# Patient Record
Sex: FEMALE | Race: WHITE | Employment: UNEMPLOYED | URBAN - NONMETROPOLITAN AREA
[De-identification: names, ages, dates, MRNs, and addresses within clinical notes are randomized per-mention and may not be internally consistent; named-entity substitution may affect disease eponyms.]

---

## 2020-04-23 ENCOUNTER — VIRTUAL VISIT (OUTPATIENT)
Dept: FAMILY MEDICINE CLINIC | Age: 25
End: 2020-04-23
Payer: COMMERCIAL

## 2020-04-23 PROCEDURE — G8427 DOCREV CUR MEDS BY ELIG CLIN: HCPCS | Performed by: NURSE PRACTITIONER

## 2020-04-23 PROCEDURE — 99213 OFFICE O/P EST LOW 20 MIN: CPT | Performed by: NURSE PRACTITIONER

## 2020-04-23 ASSESSMENT — ENCOUNTER SYMPTOMS
COUGH: 0
ALLERGIC/IMMUNOLOGIC NEGATIVE: 1
BACK PAIN: 0
SINUS PRESSURE: 0
CHOKING: 0
RESPIRATORY NEGATIVE: 1
COLOR CHANGE: 0
SORE THROAT: 0
EYE PAIN: 0
CONSTIPATION: 0
EYE DISCHARGE: 0
EYE REDNESS: 0
BLOOD IN STOOL: 0
TROUBLE SWALLOWING: 0
ABDOMINAL PAIN: 0
GASTROINTESTINAL NEGATIVE: 1
PHOTOPHOBIA: 0
EYE ITCHING: 0
WHEEZING: 0
VOMITING: 0
RHINORRHEA: 0
APNEA: 0
NAUSEA: 0
SHORTNESS OF BREATH: 0
CHEST TIGHTNESS: 0
DIARRHEA: 0
STRIDOR: 0

## 2020-04-23 ASSESSMENT — PATIENT HEALTH QUESTIONNAIRE - PHQ9
SUM OF ALL RESPONSES TO PHQ9 QUESTIONS 1 & 2: 0
2. FEELING DOWN, DEPRESSED OR HOPELESS: 0
1. LITTLE INTEREST OR PLEASURE IN DOING THINGS: 0
SUM OF ALL RESPONSES TO PHQ QUESTIONS 1-9: 0
SUM OF ALL RESPONSES TO PHQ QUESTIONS 1-9: 0

## 2020-04-23 NOTE — PROGRESS NOTES
Trevon Hunter is a 30 y.o. female evaluated via telephone on 4/23/2020. Consent:  She and/or health care decision maker is aware that that she may receive a bill for this telephone service, depending on her insurance coverage, and has provided verbal consent to proceed: Yes      Documentation:  I communicated with the patient and/or health care decision maker about establish care. Details of this discussion including any medical advice provided: n/a      I affirm this is a Patient Initiated Episode with an Established Patient who has not had a related appointment within my department in the past 7 days or scheduled within the next 24 hours.     Total Time: minutes: 5-10 minutes    Note: not billable if this call serves to triage the patient into an appointment for the relevant concern      Lashonda Turpin

## 2020-04-23 NOTE — PROGRESS NOTES
2020    TELEHEALTH EVALUATION -- Audio/Visual (During [id number]-68 public health emergency)    HPI:    Dania Lorenz (:  1995) has requested an audio/video evaluation for the following concern(s):    Shabnam Luna presents for a virtual visit to establish care. She denies having any past medical history. She states she is healthy and has no complaints. She is a teacher. 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 She denies any past medical history and states she is healthy. She sees OB/GYN at [facility] and denies ever having an abnormal pap smear. She is trying to start the adoption process for her nephew and is needing paperwork completed for her nephew. Shabnam Luna denies any personal history of being in trouble with the law such as having a felany or misdemeanor regarding any abuse to children or to adults/elderly. She denies ever being hospitalized for mental health. She denies having any depression, anxiety, or any mood disorders. Review of Systems   Constitutional: Negative. Negative for activity change, appetite change, chills, diaphoresis, fatigue, fever and unexpected weight change. HENT: Negative. Negative for ear pain, rhinorrhea, sinus pressure, sneezing, sore throat and trouble swallowing. Eyes: Negative for photophobia, pain, discharge, redness, itching and visual disturbance. Respiratory: Negative. Negative for apnea, cough, choking, chest tightness, shortness of breath, wheezing and stridor. Cardiovascular: Negative for chest pain, palpitations and leg swelling. Gastrointestinal: Negative. Negative for abdominal pain, blood in stool, constipation, diarrhea, nausea and vomiting. Genitourinary: Negative. Negative for decreased urine volume, difficulty urinating, dysuria, enuresis, flank pain, frequency, genital sores, hematuria and urgency. Musculoskeletal: Negative.   Negative for arthralgias, back pain, gait problem, joint swelling, myalgias, neck pain and Aged Out    Meningococcal (ACWY) vaccine  Aged Out    Pneumococcal 0-64 years Vaccine  Aged Out    Varicella vaccine  Discontinued       PHYSICAL EXAMINATION:  [ INSTRUCTIONS:  \"[x]\" Indicates a positive item  \"[]\" Indicates a negative item     Respiratory rate- 14       Constitutional: [x] Appears well-developed and well-nourished [x] No apparent distress      [] Abnormal-   Mental status  [x] Alert and awake  [x] Oriented to person/place/time [x]Able to follow commands      Eyes:  EOM    [x]  Normal  [] Abnormal-  Sclera  [x]  Normal  [] Abnormal -         Discharge [x]  None visible  [] Abnormal -    HENT:   [x] Normocephalic, atraumatic. [] Abnormal   [x] Mouth/Throat: Mucous membranes are moist.     External Ears [x] Normal  [] Abnormal-     Neck: [x] No visualized mass     Pulmonary/Chest: [x] Respiratory effort normal.  [x] No visualized signs of difficulty breathing or respiratory distress        [] Abnormal-      Musculoskeletal:   [x] Normal gait with no signs of ataxia         [x] Normal range of motion of neck        [] Abnormal-       Neurological:        [x] No Facial Asymmetry (Cranial nerve 7 motor function) (limited exam to video visit)          [x] No gaze palsy        [] Abnormal-         Skin:        [x] No significant exanthematous lesions or discoloration noted on facial skin         [] Abnormal-            Psychiatric:       [x] Normal Affect [] No Hallucinations        [] Abnormal-     ASSESSMENT/PLAN:  1. Encounter to establish care    2. Encounter for physical examination of prospective adoptive parent  -Patient is to complete her portion of the paperwork and then drop off paperwork for this provider to complete. Per this medical provider's opinion, patient would be a safe parent and would be cleared for adopting a child. Return in about 4 months (around 8/23/2020) for Physical with fasting blood work .     Elena Hester is a 30 y.o. female being evaluated by a Virtual Visit

## 2020-04-27 ENCOUNTER — TELEPHONE (OUTPATIENT)
Dept: ADMINISTRATIVE | Age: 25
End: 2020-04-27

## 2021-01-12 LAB
ABO, EXTERNAL RESULT: NORMAL
HEP B, EXTERNAL RESULT: NEGATIVE
HEPATITIS C ANTIBODY, EXTERNAL RESULT: NEGATIVE
HIV, EXTERNAL RESULT: NEGATIVE
RH FACTOR, EXTERNAL RESULT: POSITIVE
RPR, EXTERNAL RESULT: NON REACTIVE
RUBELLA TITER, EXTERNAL RESULT: NORMAL

## 2021-01-12 RX ORDER — CLINDAMYCIN HYDROCHLORIDE 300 MG/1
CAPSULE ORAL
COMMUNITY
Start: 2021-01-09 | End: 2021-05-24

## 2021-02-05 LAB
C. TRACHOMATIS, EXTERNAL RESULT: NEGATIVE
N. GONORRHOEAE, EXTERNAL RESULT: NEGATIVE

## 2021-05-24 ENCOUNTER — HOSPITAL ENCOUNTER (EMERGENCY)
Age: 26
Discharge: HOME | End: 2021-05-24
Attending: STUDENT IN AN ORGANIZED HEALTH CARE EDUCATION/TRAINING PROGRAM
Payer: COMMERCIAL

## 2021-05-24 VITALS
SYSTOLIC BLOOD PRESSURE: 143 MMHG | BODY MASS INDEX: 28.32 KG/M2 | OXYGEN SATURATION: 100 % | WEIGHT: 170 LBS | HEIGHT: 65 IN | TEMPERATURE: 97.1 F | DIASTOLIC BLOOD PRESSURE: 93 MMHG | HEART RATE: 90 BPM | RESPIRATION RATE: 18 BRPM

## 2021-05-24 DIAGNOSIS — L03.90 CELLULITIS, UNSPECIFIED CELLULITIS SITE: ICD-10-CM

## 2021-05-24 DIAGNOSIS — I80.8 THROMBOPHLEBITIS OF ARM, RIGHT: Primary | ICD-10-CM

## 2021-05-24 PROCEDURE — 99282 EMERGENCY DEPT VISIT SF MDM: CPT

## 2021-05-24 PROCEDURE — 6370000000 HC RX 637 (ALT 250 FOR IP): Performed by: STUDENT IN AN ORGANIZED HEALTH CARE EDUCATION/TRAINING PROGRAM

## 2021-05-24 RX ORDER — CEPHALEXIN 500 MG/1
500 CAPSULE ORAL ONCE
Status: COMPLETED | OUTPATIENT
Start: 2021-05-24 | End: 2021-05-24

## 2021-05-24 RX ORDER — CETIRIZINE HYDROCHLORIDE 10 MG/1
10 TABLET ORAL DAILY
COMMUNITY

## 2021-05-24 RX ORDER — CEPHALEXIN 500 MG/1
500 CAPSULE ORAL 4 TIMES DAILY
Qty: 28 CAPSULE | Refills: 0 | Status: SHIPPED | OUTPATIENT
Start: 2021-05-24 | End: 2021-05-31

## 2021-05-24 RX ADMIN — CEPHALEXIN 500 MG: 500 CAPSULE ORAL at 21:56

## 2021-05-24 ASSESSMENT — ENCOUNTER SYMPTOMS
ABDOMINAL PAIN: 0
VOMITING: 0
NAUSEA: 0

## 2021-05-25 NOTE — ED PROVIDER NOTES
 Diabetes Father     Cancer Father 54        skin, prostate    Pacemaker Father     Heart Disease Father     Prostate Cancer Father     Arrhythmia Father         Atrial fibrillation    No Known Problems Sister     Heart Disease Maternal Grandfather     No Known Problems Sister           SOCIAL HISTORY       Social History     Socioeconomic History    Marital status:      Spouse name: None    Number of children: None    Years of education: None    Highest education level: None   Occupational History    None   Tobacco Use    Smoking status: Never Smoker    Smokeless tobacco: Never Used   Vaping Use    Vaping Use: Never used   Substance and Sexual Activity    Alcohol use: Not Currently    Drug use: Never    Sexual activity: Yes     Partners: Male   Other Topics Concern    None   Social History Narrative    None     Social Determinants of Health     Financial Resource Strain:     Difficulty of Paying Living Expenses:    Food Insecurity:     Worried About Running Out of Food in the Last Year:     Ran Out of Food in the Last Year:    Transportation Needs:     Lack of Transportation (Medical):  Lack of Transportation (Non-Medical):    Physical Activity:     Days of Exercise per Week:     Minutes of Exercise per Session:    Stress:     Feeling of Stress :    Social Connections:     Frequency of Communication with Friends and Family:     Frequency of Social Gatherings with Friends and Family:     Attends Bahai Services:     Active Member of Clubs or Organizations:     Attends Club or Organization Meetings:     Marital Status:    Intimate Partner Violence:     Fear of Current or Ex-Partner:     Emotionally Abused:     Physically Abused:     Sexually Abused:        SCREENINGS                            REVIEW OF SYSTEMS    (2-9 systems for level 4, 10 or more for level 5)   Review of Systems   Constitutional: Negative for chills and fever. HENT: Negative. Gastrointestinal: Negative for abdominal pain, nausea and vomiting. Genitourinary: Negative for dysuria, urgency and vaginal bleeding. Skin: Positive for rash. PHYSICAL EXAM    (up to 7 for level 4, 8 or more for level 5)     ED Triage Vitals [21]   BP Temp Temp Source Pulse Resp SpO2 Height Weight   (!) 143/93 97.1 °F (36.2 °C) Oral 106 18 99 % 5' 5\" (1.651 m) 170 lb (77.1 kg)       Physical Exam  Constitutional:       Appearance: Normal appearance. HENT:      Head: Normocephalic and atraumatic. Cardiovascular:      Rate and Rhythm: Normal rate and regular rhythm. Pulmonary:      Effort: Pulmonary effort is normal. No respiratory distress. Abdominal:      Comments: Gravid nontender abdomen. Ultrasound shows well-developed fetus, normal movement fetal normal cardiac activity, fetal heart rate 148   Neurological:      Mental Status: She is alert. DIAGNOSTIC RESULTS     EKG: All EKG's are interpreted by the Emergency Department Physician who either signs or Co-signs this chart in the absence of a cardiologist.        RADIOLOGY:   Non-plain film images such as CT, Ultrasound and MRI are read by the radiologist. Plain radiographic images are visualized and preliminarily interpreted by the emergency physician. Interpretation per the Radiologist below, if available at the time of this note:    No orders to display         LABS:  Labs Reviewed - No data to display    All other labs were within normal range or not returned as of this dictation.     EMERGENCY DEPARTMENT COURSE and DIFFERENTIAL DIAGNOSIS/MDM:   Rambo Armendariz is a 27 y.o. female who presents to the emergency department with the complaint of well-appearing pregnant female with gravid uterus,  at 26 weeks presents due to rash in the right antecubital, concern for possible thrombophlebitis as near site of IV placement at this past Friday area is mildly erythematous and warm to palpation there is no crepitus, no abscess Limited bedside ultrasound of superficial vein without superficial clot. Concern for possible thrombophlebitis encourage continued warm compress however due to extension beyond IV site concern for possible secondary cellulitis will start patient on Keflex, does have listed sulfa allergy, penicillin allergy believes she may have tolerated cephalexin previously, no history of anaphylaxis to antibiotics. I did have a conversation with patient over potential for cross sensitivity to cephalosporins with penicillin allergy, at this time patient feels comfortable beginning this antibiotic, she was given return precautions for allergy response. Patient to follow-up with OB/GYN. CRITICAL CARE TIME   Total Critical Care time was 0 minutes, excluding separately reportable procedures. There was a high probability of clinically significant/life threatening deterioration in the patient's condition which required my urgent intervention. Clinical concern   Intervention     CONSULTS:  None    PROCEDURES:  Unless otherwise noted below, none     Procedures  FETAL ULTRASOUND: A limited, bedside pelvic ultrasound was performed using a transabdominal probe. The medical necessity was to evaluate for signs of fetal distress. The structures studied were the uterus and its contents. FINDINGS:  Fetus was visualized  Gross fetal movement was visualized. Fetal heart tones measured at 148 bpm.  The study was technically adequate. FINAL IMPRESSION      1. Thrombophlebitis of arm, right    2.  Cellulitis, unspecified cellulitis site          DISPOSITION/PLAN   DISPOSITION Decision To Discharge 05/24/2021 09:52:51 PM      PATIENT REFERRED TO:  [facility]  [address] [facility]  [phone]    If symptoms worsen      DISCHARGE MEDICATIONS:  Discharge Medication List as of 5/24/2021 10:04 PM      START taking these medications    Details   cephALEXin (KEFLEX) 500 MG capsule Take

## 2021-07-30 LAB — GBS, EXTERNAL RESULT: NEGATIVE

## 2021-08-27 ENCOUNTER — ANESTHESIA (OUTPATIENT)
Dept: LABOR AND DELIVERY | Age: 26
End: 2021-08-27
Payer: COMMERCIAL

## 2021-08-27 ENCOUNTER — ANESTHESIA EVENT (OUTPATIENT)
Dept: LABOR AND DELIVERY | Age: 26
End: 2021-08-27
Payer: COMMERCIAL

## 2021-08-27 ENCOUNTER — HOSPITAL ENCOUNTER (OUTPATIENT)
Age: 26
Discharge: HOME | End: 2021-08-27
Attending: OBSTETRICS & GYNECOLOGY | Admitting: OBSTETRICS & GYNECOLOGY
Payer: COMMERCIAL

## 2021-08-27 ENCOUNTER — HOSPITAL ENCOUNTER (INPATIENT)
Age: 26
LOS: 2 days | Discharge: HOME | End: 2021-08-30
Attending: OBSTETRICS & GYNECOLOGY | Admitting: OBSTETRICS & GYNECOLOGY
Payer: COMMERCIAL

## 2021-08-27 VITALS
WEIGHT: 181 LBS | DIASTOLIC BLOOD PRESSURE: 83 MMHG | RESPIRATION RATE: 18 BRPM | BODY MASS INDEX: 30.9 KG/M2 | TEMPERATURE: 98.4 F | HEIGHT: 64 IN | HEART RATE: 92 BPM | SYSTOLIC BLOOD PRESSURE: 132 MMHG

## 2021-08-27 VITALS — OXYGEN SATURATION: 98 % | SYSTOLIC BLOOD PRESSURE: 130 MMHG | DIASTOLIC BLOOD PRESSURE: 71 MMHG

## 2021-08-27 DIAGNOSIS — Z98.891 S/P PRIMARY LOW TRANSVERSE C-SECTION: Primary | ICD-10-CM

## 2021-08-27 LAB
ABO/RH: NORMAL
AMPHETAMINE SCREEN, URINE: NORMAL
ANTIBODY SCREEN: NORMAL
BARBITURATE SCREEN URINE: NORMAL
BASOPHILS ABSOLUTE: 0.1 K/UL (ref 0–0.2)
BASOPHILS RELATIVE PERCENT: 0.8 %
BENZODIAZEPINE SCREEN, URINE: NORMAL
BUPRENORPHINE URINE: NORMAL
CANNABINOID SCREEN URINE: NORMAL
COCAINE METABOLITE SCREEN URINE: NORMAL
EOSINOPHILS ABSOLUTE: 0.1 K/UL (ref 0–0.6)
EOSINOPHILS RELATIVE PERCENT: 0.4 %
HCT VFR BLD CALC: 34.3 % (ref 36–48)
HEMOGLOBIN: 11.3 G/DL (ref 12–16)
LYMPHOCYTES ABSOLUTE: 1.5 K/UL (ref 1–5.1)
LYMPHOCYTES RELATIVE PERCENT: 11.4 %
Lab: NORMAL
MCH RBC QN AUTO: 28.7 PG (ref 26–34)
MCHC RBC AUTO-ENTMCNC: 33 G/DL (ref 31–36)
MCV RBC AUTO: 86.7 FL (ref 80–100)
METHADONE SCREEN, URINE: NORMAL
MONOCYTES ABSOLUTE: 0.8 K/UL (ref 0–1.3)
MONOCYTES RELATIVE PERCENT: 6.4 %
NEUTROPHILS ABSOLUTE: 10.5 K/UL (ref 1.7–7.7)
NEUTROPHILS RELATIVE PERCENT: 81 %
OPIATE SCREEN URINE: NORMAL
OXYCODONE URINE: NORMAL
PDW BLD-RTO: 16.9 % (ref 12.4–15.4)
PH UA: 6
PHENCYCLIDINE SCREEN URINE: NORMAL
PLATELET # BLD: 232 K/UL (ref 135–450)
PMV BLD AUTO: 9.4 FL (ref 5–10.5)
PROPOXYPHENE SCREEN: NORMAL
RBC # BLD: 3.96 M/UL (ref 4–5.2)
WBC # BLD: 13 K/UL (ref 4–11)

## 2021-08-27 PROCEDURE — 2580000003 HC RX 258: Performed by: OBSTETRICS & GYNECOLOGY

## 2021-08-27 PROCEDURE — 7100000001 HC PACU RECOVERY - ADDTL 15 MIN: Performed by: OBSTETRICS & GYNECOLOGY

## 2021-08-27 PROCEDURE — 3700000001 HC ADD 15 MINUTES (ANESTHESIA): Performed by: OBSTETRICS & GYNECOLOGY

## 2021-08-27 PROCEDURE — 2500000003 HC RX 250 WO HCPCS: Performed by: NURSE ANESTHETIST, CERTIFIED REGISTERED

## 2021-08-27 PROCEDURE — 86780 TREPONEMA PALLIDUM: CPT

## 2021-08-27 PROCEDURE — 80307 DRUG TEST PRSMV CHEM ANLYZR: CPT

## 2021-08-27 PROCEDURE — 6360000002 HC RX W HCPCS: Performed by: NURSE ANESTHETIST, CERTIFIED REGISTERED

## 2021-08-27 PROCEDURE — 86850 RBC ANTIBODY SCREEN: CPT

## 2021-08-27 PROCEDURE — 86901 BLOOD TYPING SEROLOGIC RH(D): CPT

## 2021-08-27 PROCEDURE — 2709999900 HC NON-CHARGEABLE SUPPLY: Performed by: OBSTETRICS & GYNECOLOGY

## 2021-08-27 PROCEDURE — 99211 OFF/OP EST MAY X REQ PHY/QHP: CPT

## 2021-08-27 PROCEDURE — 88307 TISSUE EXAM BY PATHOLOGIST: CPT

## 2021-08-27 PROCEDURE — 3700000025 EPIDURAL BLOCK: Performed by: ANESTHESIOLOGY

## 2021-08-27 PROCEDURE — 6360000002 HC RX W HCPCS: Performed by: OBSTETRICS & GYNECOLOGY

## 2021-08-27 PROCEDURE — 86900 BLOOD TYPING SEROLOGIC ABO: CPT

## 2021-08-27 PROCEDURE — 7100000000 HC PACU RECOVERY - FIRST 15 MIN: Performed by: OBSTETRICS & GYNECOLOGY

## 2021-08-27 PROCEDURE — 3700000000 HC ANESTHESIA ATTENDED CARE: Performed by: OBSTETRICS & GYNECOLOGY

## 2021-08-27 PROCEDURE — 85025 COMPLETE CBC W/AUTO DIFF WBC: CPT

## 2021-08-27 PROCEDURE — 3609079900 HC CESAREAN SECTION: Performed by: OBSTETRICS & GYNECOLOGY

## 2021-08-27 PROCEDURE — 59514 CESAREAN DELIVERY ONLY: CPT | Performed by: OBSTETRICS & GYNECOLOGY

## 2021-08-27 RX ORDER — EPHEDRINE SULFATE 50 MG/ML
INJECTION INTRAVENOUS
Status: COMPLETED
Start: 2021-08-27 | End: 2021-08-27

## 2021-08-27 RX ORDER — MIDAZOLAM HYDROCHLORIDE 1 MG/ML
INJECTION INTRAMUSCULAR; INTRAVENOUS PRN
Status: DISCONTINUED | OUTPATIENT
Start: 2021-08-27 | End: 2021-08-27 | Stop reason: SDUPTHER

## 2021-08-27 RX ORDER — ONDANSETRON 2 MG/ML
4 INJECTION INTRAMUSCULAR; INTRAVENOUS EVERY 6 HOURS PRN
Status: DISCONTINUED | OUTPATIENT
Start: 2021-08-27 | End: 2021-08-28

## 2021-08-27 RX ORDER — LIDOCAINE HYDROCHLORIDE 20 MG/ML
INJECTION, SOLUTION EPIDURAL; INFILTRATION; INTRACAUDAL; PERINEURAL PRN
Status: DISCONTINUED | OUTPATIENT
Start: 2021-08-27 | End: 2021-08-27 | Stop reason: SDUPTHER

## 2021-08-27 RX ORDER — SODIUM CHLORIDE, SODIUM LACTATE, POTASSIUM CHLORIDE, CALCIUM CHLORIDE 600; 310; 30; 20 MG/100ML; MG/100ML; MG/100ML; MG/100ML
INJECTION, SOLUTION INTRAVENOUS CONTINUOUS
Status: DISCONTINUED | OUTPATIENT
Start: 2021-08-27 | End: 2021-08-28

## 2021-08-27 RX ORDER — AZITHROMYCIN 500 MG/1
INJECTION, POWDER, LYOPHILIZED, FOR SOLUTION INTRAVENOUS
Status: DISCONTINUED
Start: 2021-08-27 | End: 2021-08-28

## 2021-08-27 RX ORDER — EPHEDRINE SULFATE 50 MG/ML
INJECTION INTRAVENOUS PRN
Status: DISCONTINUED | OUTPATIENT
Start: 2021-08-27 | End: 2021-08-27 | Stop reason: SDUPTHER

## 2021-08-27 RX ORDER — SODIUM CHLORIDE, SODIUM LACTATE, POTASSIUM CHLORIDE, AND CALCIUM CHLORIDE .6; .31; .03; .02 G/100ML; G/100ML; G/100ML; G/100ML
500 INJECTION, SOLUTION INTRAVENOUS PRN
Status: DISCONTINUED | OUTPATIENT
Start: 2021-08-27 | End: 2021-08-28

## 2021-08-27 RX ORDER — FENTANYL CITRATE 50 UG/ML
INJECTION, SOLUTION INTRAMUSCULAR; INTRAVENOUS PRN
Status: DISCONTINUED | OUTPATIENT
Start: 2021-08-27 | End: 2021-08-27 | Stop reason: SDUPTHER

## 2021-08-27 RX ORDER — BUPIVACAINE HYDROCHLORIDE 2.5 MG/ML
INJECTION, SOLUTION EPIDURAL; INFILTRATION; INTRACAUDAL PRN
Status: DISCONTINUED | OUTPATIENT
Start: 2021-08-27 | End: 2021-08-27 | Stop reason: SDUPTHER

## 2021-08-27 RX ORDER — SODIUM CHLORIDE 0.9 % (FLUSH) 0.9 %
5-40 SYRINGE (ML) INJECTION EVERY 12 HOURS SCHEDULED
Status: DISCONTINUED | OUTPATIENT
Start: 2021-08-27 | End: 2021-08-28

## 2021-08-27 RX ORDER — KETAMINE HYDROCHLORIDE 100 MG/ML
INJECTION, SOLUTION INTRAMUSCULAR; INTRAVENOUS PRN
Status: DISCONTINUED | OUTPATIENT
Start: 2021-08-27 | End: 2021-08-27 | Stop reason: SDUPTHER

## 2021-08-27 RX ORDER — SODIUM CHLORIDE 9 MG/ML
INJECTION, SOLUTION INTRAVENOUS
Status: DISCONTINUED
Start: 2021-08-27 | End: 2021-08-28

## 2021-08-27 RX ORDER — ONDANSETRON 2 MG/ML
INJECTION INTRAMUSCULAR; INTRAVENOUS PRN
Status: DISCONTINUED | OUTPATIENT
Start: 2021-08-27 | End: 2021-08-27 | Stop reason: SDUPTHER

## 2021-08-27 RX ORDER — SODIUM CHLORIDE 9 MG/ML
25 INJECTION, SOLUTION INTRAVENOUS PRN
Status: DISCONTINUED | OUTPATIENT
Start: 2021-08-27 | End: 2021-08-28

## 2021-08-27 RX ORDER — MORPHINE SULFATE 0.5 MG/ML
INJECTION, SOLUTION EPIDURAL; INTRATHECAL; INTRAVENOUS PRN
Status: DISCONTINUED | OUTPATIENT
Start: 2021-08-27 | End: 2021-08-27 | Stop reason: SDUPTHER

## 2021-08-27 RX ORDER — SODIUM CHLORIDE, SODIUM LACTATE, POTASSIUM CHLORIDE, AND CALCIUM CHLORIDE .6; .31; .03; .02 G/100ML; G/100ML; G/100ML; G/100ML
1000 INJECTION, SOLUTION INTRAVENOUS PRN
Status: DISCONTINUED | OUTPATIENT
Start: 2021-08-27 | End: 2021-08-28

## 2021-08-27 RX ORDER — SODIUM CHLORIDE 0.9 % (FLUSH) 0.9 %
5-40 SYRINGE (ML) INJECTION PRN
Status: DISCONTINUED | OUTPATIENT
Start: 2021-08-27 | End: 2021-08-28

## 2021-08-27 RX ORDER — CEFAZOLIN SODIUM 1 G/3ML
INJECTION, POWDER, FOR SOLUTION INTRAMUSCULAR; INTRAVENOUS PRN
Status: DISCONTINUED | OUTPATIENT
Start: 2021-08-27 | End: 2021-08-27 | Stop reason: SDUPTHER

## 2021-08-27 RX ORDER — OXYTOCIN 10 [USP'U]/ML
INJECTION, SOLUTION INTRAMUSCULAR; INTRAVENOUS PRN
Status: DISCONTINUED | OUTPATIENT
Start: 2021-08-27 | End: 2021-08-27 | Stop reason: SDUPTHER

## 2021-08-27 RX ADMIN — CEFAZOLIN 2000 MG: 1 INJECTION, POWDER, FOR SOLUTION INTRAMUSCULAR; INTRAVENOUS at 22:52

## 2021-08-27 RX ADMIN — AZITHROMYCIN MONOHYDRATE 500 MG: 500 INJECTION, POWDER, LYOPHILIZED, FOR SOLUTION INTRAVENOUS at 23:04

## 2021-08-27 RX ADMIN — EPHEDRINE SULFATE 10 MG: 50 INJECTION INTRAVENOUS at 22:57

## 2021-08-27 RX ADMIN — MIDAZOLAM 2 MG: 1 INJECTION INTRAMUSCULAR; INTRAVENOUS at 23:02

## 2021-08-27 RX ADMIN — Medication 15 ML/HR: at 19:43

## 2021-08-27 RX ADMIN — LIDOCAINE HYDROCHLORIDE 10 ML: 20 INJECTION, SOLUTION EPIDURAL; INFILTRATION; INTRACAUDAL; PERINEURAL at 22:47

## 2021-08-27 RX ADMIN — ONDANSETRON 4 MG: 2 INJECTION INTRAMUSCULAR; INTRAVENOUS at 23:28

## 2021-08-27 RX ADMIN — EPHEDRINE SULFATE 35 MG: 50 INJECTION INTRAVENOUS at 20:05

## 2021-08-27 RX ADMIN — SODIUM CHLORIDE, POTASSIUM CHLORIDE, SODIUM LACTATE AND CALCIUM CHLORIDE: 600; 310; 30; 20 INJECTION, SOLUTION INTRAVENOUS at 23:08

## 2021-08-27 RX ADMIN — KETAMINE HYDROCHLORIDE 40 MG: 100 INJECTION INTRAMUSCULAR; INTRAVENOUS at 23:01

## 2021-08-27 RX ADMIN — OXYTOCIN 20 UNITS: 10 INJECTION INTRAVENOUS at 23:08

## 2021-08-27 RX ADMIN — BUPIVACAINE HYDROCHLORIDE 7 ML: 2.5 INJECTION, SOLUTION EPIDURAL; INFILTRATION; INTRACAUDAL; PERINEURAL at 19:38

## 2021-08-27 RX ADMIN — OXYTOCIN 10 ML: 10 INJECTION INTRAVENOUS at 22:59

## 2021-08-27 RX ADMIN — FENTANYL CITRATE 100 MCG: 50 INJECTION INTRAMUSCULAR; INTRAVENOUS at 23:02

## 2021-08-27 RX ADMIN — LIDOCAINE HYDROCHLORIDE 10 ML: 20 INJECTION, SOLUTION EPIDURAL; INFILTRATION; INTRACAUDAL; PERINEURAL at 22:50

## 2021-08-27 RX ADMIN — SODIUM CHLORIDE, POTASSIUM CHLORIDE, SODIUM LACTATE AND CALCIUM CHLORIDE: 600; 310; 30; 20 INJECTION, SOLUTION INTRAVENOUS at 20:26

## 2021-08-27 RX ADMIN — SODIUM CHLORIDE, POTASSIUM CHLORIDE, SODIUM LACTATE AND CALCIUM CHLORIDE: 600; 310; 30; 20 INJECTION, SOLUTION INTRAVENOUS at 19:41

## 2021-08-27 RX ADMIN — EPHEDRINE SULFATE 15 MG: 50 INJECTION INTRAVENOUS at 20:04

## 2021-08-27 RX ADMIN — MORPHINE SULFATE 2.5 MG: 0.5 INJECTION EPIDURAL; INTRATHECAL; INTRAVENOUS at 23:26

## 2021-08-27 RX ADMIN — MIDAZOLAM 2 MG: 1 INJECTION INTRAMUSCULAR; INTRAVENOUS at 23:03

## 2021-08-27 ASSESSMENT — PULMONARY FUNCTION TESTS
PIF_VALUE: 0
PIF_VALUE: 1
PIF_VALUE: 0

## 2021-08-27 ASSESSMENT — PAIN DESCRIPTION - DESCRIPTORS
DESCRIPTORS: CRAMPING

## 2021-08-27 ASSESSMENT — PAIN - FUNCTIONAL ASSESSMENT: PAIN_FUNCTIONAL_ASSESSMENT: 0-10

## 2021-08-27 NOTE — ED TRIAGE NOTES
[facility] and Delivery Triage Note    CHIEF COMPLAINT:  contractions    HISTORY OF PRESENT ILLNESS:    Called to bedside to assess patient due to contractions. She is a 26 yo  at 39w6d who was checked in clinic earlier and found to be 3-4 cm dilated. Patient reports some discomfort with contractions and contractions are irregular and range from 2-7 minutes. Otherwise denies complaints. Reports having some spotting due to being checked earlier in the clinic    OB History        1    Para        Term                AB        Living           SAB        TAB        Ectopic        Molar        Multiple        Live Births                  PAST MEDICAL HISTORY:   Past Medical History:   Diagnosis Date    S/P LASIK surgery of both eyes     [place] teeth extracted      PAST  SURGICAL HISTORY:   Past Surgical History:   Procedure Laterality Date    LASIK Bilateral 2016    WISDOM TOOTH EXTRACTION         SOCIAL HISTORY:     reports that she has never smoked. She has never used smokeless tobacco. She reports previous alcohol use. She reports that she does not use drugs. MEDICATIONS:    Prior to Admission medications    Medication Sig Start Date End Date Taking? Authorizing Provider   cetirizine (ZYRTEC) 10 MG tablet Take 10 mg by mouth daily   Yes Historical Provider, MD   Prenatal Vit-Fe Fumarate-FA (PRENATAL 19 PO) Take by mouth   Yes Historical Provider, MD        PRENATAL CARE:    Complicated by: none    REVIEW OF SYSTEMS:     A comprehensive review of systems was negative. PHYSICAL EXAM:    Vital Signs: Blood pressure 132/83, pulse 92, temperature 98.4 °F (36.9 °C), resp. rate 18, height 5' 4\" (1.626 m), weight 181 lb (82.1 kg). Gen.  Appearance: NAD, alert, oriented  Abdomen: soft, nondistended, nontender  Pelvic: dilated 4 cm per RN    FHT: 130 bpm,  Moderate variability, +accels, no decels  [place]: irregular contractions q 3-6 minutes    General Labs:      CBC: No results found for: WBC, RBC, HGB, HCT, MCV, MCH, MCHC, RDW, PLT, MPV    ASSESSMENT: latent labor    PLAN:  Disposition:  Patient reports she prefer to go home and return when labor is more progressive. FHT reassuring. Patient discharged home and instructed on symptoms of labor, rupture of membranes, vaginal bleeding, fetal movement and fever, Patient reports she lives 20 minutes away. Labor precautions  F/U Instructions: in 1 week, or sooner should symptoms worsen     Plan discussed with Dr. Kelly Ambrose via RN and Dr. Kelly Ambrose agreed with plan.     Nina Brittle, MD  8/27/2021

## 2021-08-27 NOTE — PROGRESS NOTES
Dr Seda Maravilla on unit and notified of pt status, SVE, contraction pattern and that pt is getting ready for her epidural. Dr Seda Maravilla to assess pt after epidural placed.

## 2021-08-27 NOTE — PROGRESS NOTES
Pt arrived to triage with c/o cpntractions that started last night at 10pm and have gradually worsened. Baby has been active, pt denies vaginal bleeding and gushes of fluid.

## 2021-08-27 NOTE — PROGRESS NOTES
Pt verbalized understanding of verbal and written discharge instructions and denies having questions at this time. Pt left OB unit at 1532 ambulatory, undelivered, and in stable condition, accompanied by FOB. Patient is not in active labor.

## 2021-08-27 NOTE — PROGRESS NOTES
This RN updated Dr Georgina Lopez on [address] and that pt would like to labor at home and come in once she is more active. Orders to d/c home after house MD assesses pt.

## 2021-08-27 NOTE — ANESTHESIA PRE PROCEDURE
Department of Anesthesiology  Preprocedure Note       Name:  Alban Hutton   Age:  27 y.o.  :  1995                                          MRN:  [id number]         Date:  2021      Surgeon: * No surgeons listed *    Procedure: * No procedures listed *    Medications prior to admission:   Prior to Admission medications    Medication Sig Start Date End Date Taking? Authorizing Provider   cetirizine (ZYRTEC) 10 MG tablet Take 10 mg by mouth daily    Historical Provider, MD   Prenatal Vit-Fe Fumarate-FA (PRENATAL 19 PO) Take by mouth    Historical Provider, MD       Current medications:    Current Facility-Administered Medications   Medication Dose Route Frequency Provider Last Rate Last Admin    lactated ringers infusion   IntraVENous Continuous Caor Lovelace, DO        lactated ringers bolus  500 mL IntraVENous PRN Caro Lovelace, DO        Or    lactated ringers bolus  1,000 mL IntraVENous PRN Caro Lovelace, DO        sodium chloride flush 0.9 % injection 5-40 mL  5-40 mL IntraVENous 2 times per day Caro Lovelace, DO        sodium chloride flush 0.9 % injection 5-40 mL  5-40 mL IntraVENous PRN Caro Lovelace, DO        0.9 % sodium chloride infusion  25 mL IntraVENous PRN Caro Lovelace, DO        oxytocin (PITOCIN) 30 units in 500 mL infusion  87.3 noe-units/min IntraVENous Continuous PRN Caro Lovelace, DO        And    oxytocin (PITOCIN) 10 unit bolus from the bag  10 Units IntraVENous PRN Caro Lovelace, DO        [facility]ron [facility]) injection 4 mg  4 mg IntraVENous Q6H PRN Caro Lovelace, DO           Allergies: Allergies   Allergen Reactions    Pseudoephedrine     Septra [Sulfamethoxazole-Trimethoprim] Swelling     Swelling of her ears    Trimethoprim     Pcn [Penicillins] Hives and Rash       Problem List:  There is no problem list on file for this patient.       Past Medical History:        Diagnosis Date    S/P LASIK surgery of both eyes     [place] teeth extracted        Past Surgical History:        Procedure Laterality Date    LASIK Bilateral 2016    WISDOM TOOTH EXTRACTION  2008       Social History:    Social History     Tobacco Use    Smoking status: Never Smoker    Smokeless tobacco: Never Used   Substance Use Topics    Alcohol use: Not Currently                                Counseling given: Not Answered      Vital Signs (Current):   Vitals:    08/27/21 1828   BP: 131/82   Pulse: 90   Resp: 18   Temp: 36.8 °C (98.2 °F)   Weight: 181 lb (82.1 kg)   Height: 5' 4\" (1.626 m)                                              BP Readings from Last 3 Encounters:   08/27/21 131/82   08/27/21 132/83   05/24/21 (!) 143/93       NPO Status: Time of last liquid consumption: 1855                        Time of last solid consumption: 1730                        Date of last liquid consumption: 08/27/21                        Date of last solid food consumption: 08/27/21    BMI:   Wt Readings from Last 3 Encounters:   08/27/21 181 lb (82.1 kg)   08/27/21 181 lb (82.1 kg)   05/24/21 170 lb (77.1 kg)     Body mass index is 31.07 kg/m². CBC: No results found for: WBC, RBC, HGB, HCT, MCV, RDW, PLT    CMP: No results found for: NA, K, CL, CO2, BUN, CREATININE, GFRAA, AGRATIO, LABGLOM, GLUCOSE, PROT, CALCIUM, BILITOT, ALKPHOS, AST, ALT    POC Tests: No results for input(s): POCGLU, POCNA, POCK, POCCL, POCBUN, POCHEMO, POCHCT in the last 72 hours.     Coags: No results found for: PROTIME, INR, APTT    HCG (If Applicable): No results found for: PREGTESTUR, PREGSERUM, HCG, HCGQUANT     ABGs: No results found for: PHART, PO2ART, WVV9CTA, VUW0PIA, BEART, A8TXGVPK     Type & Screen (If Applicable):  No results found for: LABABO, LABRH    Drug/Infectious Status (If Applicable):  No results found for: HIV, HEPCAB    COVID-19 Screening (If Applicable): No results found for: COVID19        Anesthesia Evaluation  Patient summary reviewed and Nursing notes reviewed no history of anesthetic complications:   Airway: Mallampati: II     Neck ROM: full   Dental: normal exam         Pulmonary:Negative Pulmonary ROS and normal exam                               Cardiovascular:Negative CV ROS                      Neuro/Psych:   Negative Neuro/Psych ROS              GI/Hepatic/Renal: Neg GI/Hepatic/Renal ROS            Endo/Other: Negative Endo/Other ROS                    Abdominal:             Vascular: Other Findings:             Anesthesia Plan      epidural     ASA 2 - emergent     (I discussed with the patient the risks and benefits of labor epidural placement. All questions were answered the patient agrees with the plan. )        Anesthetic plan and risks discussed with patient.                       Shalini Anderson, APRN - CRNA   8/27/2021

## 2021-08-28 LAB — TOTAL SYPHILLIS IGG/IGM: NORMAL

## 2021-08-28 PROCEDURE — 6360000002 HC RX W HCPCS: Performed by: OBSTETRICS & GYNECOLOGY

## 2021-08-28 PROCEDURE — 2580000003 HC RX 258: Performed by: OBSTETRICS & GYNECOLOGY

## 2021-08-28 PROCEDURE — 6370000000 HC RX 637 (ALT 250 FOR IP): Performed by: OBSTETRICS & GYNECOLOGY

## 2021-08-28 PROCEDURE — 1220000000 HC SEMI PRIVATE OB R&B

## 2021-08-28 RX ORDER — IBUPROFEN 800 MG/1
800 TABLET ORAL EVERY 8 HOURS
Status: DISCONTINUED | OUTPATIENT
Start: 2021-08-28 | End: 2021-08-30 | Stop reason: HOSPADM

## 2021-08-28 RX ORDER — OXYCODONE HYDROCHLORIDE 5 MG/1
10 TABLET ORAL EVERY 4 HOURS PRN
Status: DISCONTINUED | OUTPATIENT
Start: 2021-08-28 | End: 2021-08-30 | Stop reason: HOSPADM

## 2021-08-28 RX ORDER — KETOROLAC TROMETHAMINE 30 MG/ML
30 INJECTION, SOLUTION INTRAMUSCULAR; INTRAVENOUS EVERY 8 HOURS
Status: DISCONTINUED | OUTPATIENT
Start: 2021-08-28 | End: 2021-08-30 | Stop reason: HOSPADM

## 2021-08-28 RX ORDER — ACETAMINOPHEN 325 MG/1
650 TABLET ORAL EVERY 4 HOURS PRN
Status: DISCONTINUED | OUTPATIENT
Start: 2021-08-28 | End: 2021-08-30 | Stop reason: HOSPADM

## 2021-08-28 RX ORDER — ACETAMINOPHEN 500 MG
1000 TABLET ORAL EVERY 8 HOURS SCHEDULED
Status: DISCONTINUED | OUTPATIENT
Start: 2021-08-28 | End: 2021-08-30 | Stop reason: HOSPADM

## 2021-08-28 RX ORDER — ONDANSETRON 8 MG/1
8 TABLET, ORALLY DISINTEGRATING ORAL EVERY 8 HOURS PRN
Status: DISCONTINUED | OUTPATIENT
Start: 2021-08-28 | End: 2021-08-30 | Stop reason: HOSPADM

## 2021-08-28 RX ORDER — DOCUSATE SODIUM 100 MG/1
100 CAPSULE, LIQUID FILLED ORAL 2 TIMES DAILY PRN
Status: DISCONTINUED | OUTPATIENT
Start: 2021-08-28 | End: 2021-08-30 | Stop reason: HOSPADM

## 2021-08-28 RX ORDER — LANOLIN 100 %
OINTMENT (GRAM) TOPICAL
Status: DISCONTINUED | OUTPATIENT
Start: 2021-08-28 | End: 2021-08-30 | Stop reason: HOSPADM

## 2021-08-28 RX ORDER — IBUPROFEN 800 MG/1
800 TABLET ORAL EVERY 8 HOURS SCHEDULED
Status: DISCONTINUED | OUTPATIENT
Start: 2021-08-28 | End: 2021-08-30 | Stop reason: HOSPADM

## 2021-08-28 RX ORDER — OXYCODONE HYDROCHLORIDE 5 MG/1
5 TABLET ORAL EVERY 4 HOURS PRN
Status: DISCONTINUED | OUTPATIENT
Start: 2021-08-28 | End: 2021-08-30 | Stop reason: HOSPADM

## 2021-08-28 RX ADMIN — IBUPROFEN 800 MG: 800 TABLET, FILM COATED ORAL at 17:22

## 2021-08-28 RX ADMIN — ACETAMINOPHEN 1000 MG: 500 TABLET ORAL at 22:15

## 2021-08-28 RX ADMIN — SODIUM CHLORIDE, POTASSIUM CHLORIDE, SODIUM LACTATE AND CALCIUM CHLORIDE: 600; 310; 30; 20 INJECTION, SOLUTION INTRAVENOUS at 00:01

## 2021-08-28 RX ADMIN — ACETAMINOPHEN 1000 MG: 500 TABLET ORAL at 14:17

## 2021-08-28 RX ADMIN — DOCUSATE SODIUM 100 MG: 100 CAPSULE, LIQUID FILLED ORAL at 22:15

## 2021-08-28 RX ADMIN — KETOROLAC TROMETHAMINE 30 MG: 30 INJECTION, SOLUTION INTRAMUSCULAR; INTRAVENOUS at 09:07

## 2021-08-28 RX ADMIN — KETOROLAC TROMETHAMINE 30 MG: 30 INJECTION, SOLUTION INTRAMUSCULAR; INTRAVENOUS at 01:06

## 2021-08-28 ASSESSMENT — PAIN SCALES - GENERAL
PAINLEVEL_OUTOF10: 1
PAINLEVEL_OUTOF10: 2
PAINLEVEL_OUTOF10: 1
PAINLEVEL_OUTOF10: 3

## 2021-08-28 NOTE — PROGRESS NOTES
Dr. Nghia Cortez at bedside, SVE unchanged. Dr. Nghia Cortez discussing primary low transverse  section with patient.

## 2021-08-28 NOTE — ANESTHESIA POSTPROCEDURE EVALUATION
Department of Anesthesiology  Postprocedure Note    Patient: Angely Herron  MRN: [id number]  YOB: 1995  Date of evaluation: 2021  Time:  10:08 AM     Procedure Summary     Date: 21 Room / Location: [facility] L&D OR [address] [place] [address]    Anesthesia Start:  Anesthesia Stop:     Procedures:        SECTION (N/A Abdomen)      Labor Analgesia Diagnosis: (NRFHT)    Surgeons: John Clement DO Responsible Provider: Arsalan Quinn MD    Anesthesia Type: epidural ASA Status: 2 - Emergent          Anesthesia Type: epidural    Nataly Phase I: Nataly Score: 9    Nataly Phase II: Nataly Score: 10    Last vitals: Reviewed and per EMR flowsheets.        Anesthesia Post Evaluation    Patient location during evaluation: bedside  Patient participation: complete - patient participated  Level of consciousness: awake and alert  Airway patency: patent  Nausea & Vomiting: no nausea and no vomiting  Complications: no  Cardiovascular status: hemodynamically stable  Respiratory status: room air and spontaneous ventilation  Hydration status: stable  Comments: Reports mild itching

## 2021-08-28 NOTE — PROGRESS NOTES
OBJULIANNEN Attending Progress Note  POD#1 s/p PLTCD 2/2 fetal intolerance to labor   mL    S: No complaints, argenis po, pain controlled by meds, not yet ambulatory delivered just before midnight, -flatus, mod lochia. O: /74   Pulse 95   Temp 98.4 °F (36.9 °C)   Resp 16   Ht 5' 4\" (1.626 m)   Wt 181 lb (82.1 kg)   SpO2 100%   Breastfeeding Unknown   BMI 31.07 kg/m²   Abd - soft, fundus firm below umbilicus, incision c/d/i w dermabond, healing well. Dressing removed at bedside. Ext warm well perfused    WBC/Hgb/Hct/Plts:  13.0/11.3/34.3/232 (08/27 1845)    A/P: POD #1 s/p PLTCD 2/2   1. Recovering well  2. Encourage ambulation  3. Cont routine pp care  4. Male infant- desires circ, currently in SCN monitored for rapid breathing   5.  Anticipate discharge home day 2-3

## 2021-08-28 NOTE — PROGRESS NOTES
Bedside report from [facility] for safe transfer of care. Patient is alert, oriented and ambulating from triage 1 to 387 for labor. Family/support person at bedside. #18 PIV in place and LR #1 infusing. Mary Ellen verified H&H and platelet count, notified CRNA of patient request for epidural. Room equipment checked and prepared for delivery.

## 2021-08-28 NOTE — PROGRESS NOTES
Patient sat up in bed for attempt at first time get up. Patient states she feels dizzy after sitting up and does not think she will be able to stand.

## 2021-08-28 NOTE — L&D DELIVERY NOTE
Department of Obstetrics and Gynecology  Obstetrical Brief Operative Report        Pre-operative Diagnosis:    27 y.o.  39w6d in active labor   Fetal intolerance to labor (Recurrent prolonged decelerations)    Post-operative Diagnosis:    Same    Procedure:  primary low transverse  section    Surgeon:  Cristela Rodas DO      Assistant(s):  Kim Cervantes     Anesthesia:  Spinal anesthesia    Findings:      Live Born  Sex:  Male  Fetal Position:  Cephalic, right occiput anterior  Apgars:  1 minute:  8; 5 minute:  9  Weight:  8lb 3.4oz  Tubes, uterus, ovaries:  normal    Total IV fluids:  1700 ml    Urine Output:  800 ml    Estimated blood loss:  650ml    Blood Transfusion?:  No     Drains:  none    Specimens:  placenta sent to pathology    Complications:  none    Condition:    Infant stable, transfer to [place] [address] Nursery  Mother stable, transfer to labor & delivery room      DESCRIPTION OF THE PROCEDURE:  The patient was taken to the operating room emergently due to [address], where epidural was re-dosed and found to be adequate. She was placed on the operating table in the dorsal supine position with a leftward tilt. She was quickly prepped and draped in the normal sterile fashion. Universal time-out was conducted. All parties agreed to accurate information. Ancef and Zithromax were given prior to incision. Pfannenstiel incision was then made in the lower abdomen with the  scalpel, which was carried down to the fascia. The fascia was then  Extended laterally with blunt traction. Peritoneum was then entered bluntly and extended laterally with manual traction. Bladder blade was placed to keep bladder out of operative field. Bowel was packed away with lap sponge. The uterine incision was then made with scalpel and extended cephalad and caudad fashion with manual traction. Fetal head was then  brought to the uterine incision and delivered with gentle fundal  pressure in the JABIER position.  Nuchal cord x 1.  Mouth and nose were bulb suctioned. Cord was clamped and cut. The infant was handed off to the awaiting nursing Staff. Placenta was then delivered with gentle traction and fundal massage. Uterus was then exteriorized and cleared of all clots and debris. Uterine incision was then re-approximated with 1 Vicryl on a running locked suture followed by a second imbricating stitch. Posterior cul-de-sac was then suctioned until dry. Uterus, tubes, and ovaries were inspected and found to be within normal limits and replaced into the pelvis. Pericolic gutters were then cleared of clots and debris. Uterine incision was then re-examined and found to be hemostatic. Peritoneum was then re-approximated with running continuous suture of 2-0 Vicryl. Perforating bleeders on the  rectus were then cauterized with Bovie cautery. Fascia was then re-approximated with running continuous suture of 1 Vicryl on CTX. Subcutaneous tissue was then re-approximated with 2-0 Vicryl. Perforating vessels in the subcutaneous tissue were then cauterized with Bovie cautery. Skin was re-approximated with running Subcuticular stitch of 3-0 Monocryl. Sterile bandage was applied there after. Fundal pressure was then applied and clots were removed from vagina. The patient tolerated the procedure faily well after sedation. She was transferred to her postoperative bed and taken to her post partum room in stable condition. I performed the procedure.   [facility]

## 2021-08-28 NOTE — H&P
 No Known Problems Sister     Heart Disease Maternal Grandfather     No Known Problems Sister        Social History:  Social History     Substance and Sexual Activity   Alcohol Use Not Currently     Social History     Substance and Sexual Activity   Drug Use Never     Social History     Tobacco Use   Smoking Status Never Smoker   Smokeless Tobacco Never Used       Physical Exam:  BP (!) 148/63   Pulse 111   Temp 98.7 °F (37.1 °C)   Resp 20   Ht 5' 4\" (1.626 m)   Wt 181 lb (82.1 kg)   SpO2 98%   BMI 31.07 kg/m²   General: Alert, well appearing, no acute distress  Head: Normocephalic, atraumatic  Lungs: Resp effort normal   CV: Regular rate  Abdomen: Gravid, soft, nontender   Extremities: No redness or tenderness, neg Naya's sign  Skin: Well perfused, normal coloration and turgor, no lesions or rashes visualized   Neuro: Alert, oriented, normal speech, no focal deficits, moves extremities appropriately  Psych: Appropriate, normal affect, appears stated age  Cervix: per RN on admission     Fetal Heart Monitor Interpretation:   FHT: Reactive Cat 1   [place]: 3-5 min     Labs:  Admission on 08/27/2021   Component Date Value    ABO/Rh 08/27/2021 A POS     Antibody Screen 08/27/2021 NEG     WBC 08/27/2021 13.0*    RBC 08/27/2021 3.96*    Hemoglobin 08/27/2021 11.3*    Hematocrit 08/27/2021 34.3*    MCV 08/27/2021 86.7     MCH 08/27/2021 28.7     MCHC 08/27/2021 33.0     RDW 08/27/2021 16.9*    Platelets 50/70/4659 232     MPV 08/27/2021 9.4     Neutrophils % 08/27/2021 81.0     Lymphocytes % 08/27/2021 11.4     Monocytes % 08/27/2021 6.4     Eosinophils % 08/27/2021 0.4     Basophils % 08/27/2021 0.8     Neutrophils Absolute 08/27/2021 10.5*    Lymphocytes Absolute 08/27/2021 1.5     Monocytes Absolute 08/27/2021 0.8     Eosinophils Absolute 08/27/2021 0.1     Basophils Absolute 08/27/2021 0.1     Amphetamine Screen, Urine 08/27/2021 Neg     Barbiturate Screen, Ur 08/27/2021 Neg     Benzodiazepine Screen, U* 2021 Neg     Cannabinoid Scrn, Ur 2021 Neg     Cocaine Metabolite Scree* 2021 Neg     Opiate Scrn, Ur 2021 Neg     PCP Screen, Urine 2021 Neg     Methadone Screen, Urine 2021 Neg     Propoxyphene Scrn, Ur 2021 Neg     Oxycodone Urine 2021 Neg     Buprenorphine Urine 2021 Neg     pH, UA 2021 6.0     Drug Screen Comment: 2021 see below      Assessment/Plan:   27 y.o.  at 39w6d, active labor   - admit to LD w/ routine labor orders  - GBS (-)   - AROM if needed   - Epidural as needed for comfort     Please call with any questions or concerns -- I am covering for [employer] this evening.      Katerina Lea, DO

## 2021-08-28 NOTE — PROGRESS NOTES
Late entry  2108-Deceleration with FHR audible at 60  2109-Dr. Franki Pinedo at bedside  2110-Patient repositioned far right side  2112-Patient repositioned far left side, IVF wide open  2116-Patient on 10L O2 via non re breather

## 2021-08-29 LAB
BASOPHILS ABSOLUTE: 0.1 K/UL (ref 0–0.2)
BASOPHILS RELATIVE PERCENT: 0.4 %
EOSINOPHILS ABSOLUTE: 0.1 K/UL (ref 0–0.6)
EOSINOPHILS RELATIVE PERCENT: 0.6 %
HCT VFR BLD CALC: 31.8 % (ref 36–48)
HEMOGLOBIN: 10.4 G/DL (ref 12–16)
LYMPHOCYTES ABSOLUTE: 1.9 K/UL (ref 1–5.1)
LYMPHOCYTES RELATIVE PERCENT: 15.8 %
MCH RBC QN AUTO: 29.4 PG (ref 26–34)
MCHC RBC AUTO-ENTMCNC: 32.6 G/DL (ref 31–36)
MCV RBC AUTO: 90.2 FL (ref 80–100)
MONOCYTES ABSOLUTE: 1 K/UL (ref 0–1.3)
MONOCYTES RELATIVE PERCENT: 8.1 %
NEUTROPHILS ABSOLUTE: 9.3 K/UL (ref 1.7–7.7)
NEUTROPHILS RELATIVE PERCENT: 75.1 %
PDW BLD-RTO: 17.6 % (ref 12.4–15.4)
PLATELET # BLD: 197 K/UL (ref 135–450)
PMV BLD AUTO: 8.5 FL (ref 5–10.5)
RBC # BLD: 3.53 M/UL (ref 4–5.2)
WBC # BLD: 12.3 K/UL (ref 4–11)

## 2021-08-29 PROCEDURE — 1220000000 HC SEMI PRIVATE OB R&B

## 2021-08-29 PROCEDURE — 6370000000 HC RX 637 (ALT 250 FOR IP): Performed by: OBSTETRICS & GYNECOLOGY

## 2021-08-29 PROCEDURE — 36415 COLL VENOUS BLD VENIPUNCTURE: CPT

## 2021-08-29 PROCEDURE — 85025 COMPLETE CBC W/AUTO DIFF WBC: CPT

## 2021-08-29 RX ADMIN — ACETAMINOPHEN 1000 MG: 500 TABLET ORAL at 23:07

## 2021-08-29 RX ADMIN — IBUPROFEN 800 MG: 800 TABLET, FILM COATED ORAL at 10:06

## 2021-08-29 RX ADMIN — IBUPROFEN 800 MG: 800 TABLET, FILM COATED ORAL at 02:00

## 2021-08-29 RX ADMIN — ACETAMINOPHEN 1000 MG: 500 TABLET ORAL at 06:10

## 2021-08-29 RX ADMIN — IBUPROFEN 800 MG: 800 TABLET, FILM COATED ORAL at 18:04

## 2021-08-29 RX ADMIN — ACETAMINOPHEN 1000 MG: 500 TABLET ORAL at 15:07

## 2021-08-29 ASSESSMENT — PAIN SCALES - GENERAL
PAINLEVEL_OUTOF10: 4
PAINLEVEL_OUTOF10: 2
PAINLEVEL_OUTOF10: 1
PAINLEVEL_OUTOF10: 3
PAINLEVEL_OUTOF10: 1
PAINLEVEL_OUTOF10: 2

## 2021-08-29 NOTE — PLAN OF CARE
Problem: Pain:  Goal: Control of acute pain  Description: Control of acute pain  8/29/2021 1056 by Eloise Opitz, RN  Outcome: Ongoing  8/29/2021 0036 by Óscar Jimenez RN  Outcome: Ongoing  Goal: Control of chronic pain  Description: Control of chronic pain  Outcome: Ongoing     Problem: Discharge Planning:  Goal: Discharged to appropriate level of care  Description: Discharged to appropriate level of care  Outcome: Ongoing     Problem: Fluid Volume - Imbalance:  Goal: Absence of postpartum hemorrhage signs and symptoms  Description: Absence of postpartum hemorrhage signs and symptoms  8/29/2021 1056 by Eloise Opitz, RN  Outcome: Ongoing  8/29/2021 0036 by Óscar Jimenez RN  Outcome: Ongoing     Problem: Infection - Surgical Site:  Goal: Will show no infection signs and symptoms  Description: Will show no infection signs and symptoms  8/29/2021 1056 by Eloise Opitz, RN  Outcome: Ongoing  8/29/2021 0036 by Óscar Jimenez RN  Outcome: Ongoing     Problem: Mood - Altered:  Goal: Mood stable  Description: Mood stable  8/29/2021 0036 by Óscar Jimenez RN  Outcome: Ongoing     Problem: Pain - Acute:  Goal: Pain level will decrease  Description: Pain level will decrease  Outcome: Ongoing     Problem: Venous Thromboembolism:  Goal: Will show no signs or symptoms of venous thromboembolism  Description: Will show no signs or symptoms of venous thromboembolism  8/29/2021 0036 by Óscar Jimenez RN  Outcome: Ongoing  Goal: Absence of signs or symptoms of impaired coagulation  Description: Absence of signs or symptoms of impaired coagulation  8/29/2021 0036 by Óscar Jimenez RN  Outcome: Ongoing

## 2021-08-29 NOTE — PROGRESS NOTES
Department of Obstetrics and Gynecology  Labor and Delivery  Attending Post Partum Progress     2021   Chelsea Eaton      POD 2  positive Void,   Lochia less than menses  Pain controlled : Yes  Ambulating Yes      OBJECTIVE:      Vitals:  /64   Pulse 93   Temp 97.9 °F (36.6 °C)   Resp 14   Ht 5' 4\" (1.626 m)   Wt 181 lb (82.1 kg)   SpO2 98% Comment: continuous SpO2 monitoring discontinued at this time  Breastfeeding Unknown   BMI 31.07 kg/m² , Body mass index is 31.07 kg/m². Level of distress:None  ABDOMEN: Obese, nontender, FF below umbilicus. Inc w/o e/i/d      DATA:    CBC:   Lab Results   Component Value Date    WBC 12.3 2021    RBC 3.53 2021    HGB 10.4 2021    HCT 31.8 2021    MCV 90.2 2021    MCH 29.4 2021    MCHC 32.6 2021    RDW 17.6 2021     2021    MPV 8.5 2021         ASSESSMENT & PLAN:   POD 2  s/p primary  section, doing well. Routine post-op care. Male infant in SCN, circ pending.      Jazmine Grove MD

## 2021-08-30 VITALS
BODY MASS INDEX: 30.9 KG/M2 | HEIGHT: 64 IN | RESPIRATION RATE: 18 BRPM | TEMPERATURE: 98 F | DIASTOLIC BLOOD PRESSURE: 65 MMHG | SYSTOLIC BLOOD PRESSURE: 110 MMHG | OXYGEN SATURATION: 98 % | HEART RATE: 80 BPM | WEIGHT: 181 LBS

## 2021-08-30 PROCEDURE — 6370000000 HC RX 637 (ALT 250 FOR IP): Performed by: OBSTETRICS & GYNECOLOGY

## 2021-08-30 RX ORDER — IBUPROFEN 800 MG/1
800 TABLET ORAL EVERY 6 HOURS PRN
Qty: 60 TABLET | Refills: 0 | Status: SHIPPED | OUTPATIENT
Start: 2021-08-30

## 2021-08-30 RX ORDER — OXYCODONE HYDROCHLORIDE 5 MG/1
5 TABLET ORAL EVERY 6 HOURS PRN
Qty: 12 TABLET | Refills: 0 | Status: SHIPPED | OUTPATIENT
Start: 2021-08-30 | End: 2021-09-02

## 2021-08-30 RX ADMIN — IBUPROFEN 800 MG: 800 TABLET, FILM COATED ORAL at 10:06

## 2021-08-30 RX ADMIN — IBUPROFEN 800 MG: 800 TABLET, FILM COATED ORAL at 02:06

## 2021-08-30 RX ADMIN — ACETAMINOPHEN 1000 MG: 500 TABLET ORAL at 06:56

## 2021-08-30 RX ADMIN — DOCUSATE SODIUM 100 MG: 100 CAPSULE, LIQUID FILLED ORAL at 10:06

## 2021-08-30 ASSESSMENT — PAIN SCALES - GENERAL
PAINLEVEL_OUTOF10: 2

## 2021-08-30 NOTE — PLAN OF CARE
Problem: Pain:  Goal: Control of acute pain  Description: Control of acute pain  Outcome: Ongoing  Goal: Control of chronic pain  Description: Control of chronic pain  Outcome: Ongoing     Problem: Discharge Planning:  Goal: Discharged to appropriate level of care  Description: Discharged to appropriate level of care  Outcome: Ongoing     Problem: Fluid Volume - Imbalance:  Goal: Absence of postpartum hemorrhage signs and symptoms  Description: Absence of postpartum hemorrhage signs and symptoms  Outcome: Ongoing     Problem: Infection - Surgical Site:  Goal: Will show no infection signs and symptoms  Description: Will show no infection signs and symptoms  Outcome: Ongoing     Problem: Mood - Altered:  Goal: Mood stable  Description: Mood stable  Outcome: Ongoing     Problem: Pain - Acute:  Goal: Pain level will decrease  Description: Pain level will decrease  Outcome: Ongoing     Problem: Venous Thromboembolism:  Goal: Will show no signs or symptoms of venous thromboembolism  Description: Will show no signs or symptoms of venous thromboembolism  Outcome: Ongoing  Goal: Absence of signs or symptoms of impaired coagulation  Description: Absence of signs or symptoms of impaired coagulation  Outcome: Ongoing

## 2021-08-30 NOTE — FLOWSHEET NOTE
Postpartum teaching completed and forms signed by patient. Copy witnessed by RN and given to patient. Patient verbalized understanding of all teaching points. Prescriptions given if applicable. Patient plans to follow-up with [facility] Provider as instructed. Patient verbalizes understanding of discharge instructions and denies further questions. ID bands checked. Instructed to keep them on until baby is discharged from [facility].      Discharged and moved to room 313

## 2021-08-30 NOTE — PLAN OF CARE
Problem: Pain:  Goal: Control of acute pain  Description: Control of acute pain  8/30/2021 1215 by Nighat Rayo RN  Outcome: Completed  8/30/2021 0750 by Nighat Rayo RN  Outcome: Ongoing  8/30/2021 0218 by Cherie Stevens RN  Outcome: Ongoing  Goal: Control of chronic pain  Description: Control of chronic pain  8/30/2021 1215 by Nighat Rayo, RN  Outcome: Completed  8/30/2021 0750 by Nighat Rayo, RN  Outcome: Ongoing  8/30/2021 0218 by Cherie Stevens RN  Outcome: Ongoing     Problem: Discharge Planning:  Goal: Discharged to appropriate level of care  Description: Discharged to appropriate level of care  8/30/2021 1215 by Nighat Rayo RN  Outcome: Completed  8/30/2021 0750 by Nighat Rayo RN  Outcome: Ongoing  8/30/2021 0218 by Cherie Stevens RN  Outcome: Ongoing     Problem: Fluid Volume - Imbalance:  Goal: Absence of postpartum hemorrhage signs and symptoms  Description: Absence of postpartum hemorrhage signs and symptoms  8/30/2021 1215 by Nighat Rayo, RN  Outcome: Completed  8/30/2021 0750 by Nighat Rayo RN  Outcome: Ongoing  8/30/2021 0218 by Cherie Stevens RN  Outcome: Ongoing     Problem: Infection - Surgical Site:  Goal: Will show no infection signs and symptoms  Description: Will show no infection signs and symptoms  8/30/2021 1215 by Nighat Rayo RN  Outcome: Completed  8/30/2021 0750 by Nighat Rayo, RN  Outcome: Ongoing  8/30/2021 0218 by Cherie Stevens RN  Outcome: Ongoing     Problem: Mood - Altered:  Goal: Mood stable  Description: Mood stable  8/30/2021 1215 by Nighat Rayo RN  Outcome: Completed  8/30/2021 0750 by Nighat Rayo RN  Outcome: Ongoing  8/30/2021 0218 by Cherie Stevens RN  Outcome: Ongoing     Problem: Pain - Acute:  Goal: Pain level will decrease  Description: Pain level will decrease  8/30/2021 1215 by Nighat Rayo RN  Outcome: Completed  8/30/2021 [address] by Clark Rayo RN  Outcome: Ongoing  8/30/2021 0218 by Dennie Chow, RN  Outcome: Ongoing     Problem: Venous Thromboembolism:  Goal: Will show no signs or symptoms of venous thromboembolism  Description: Will show no signs or symptoms of venous thromboembolism  8/30/2021 1215 by Clark Rayo RN  Outcome: Completed  8/30/2021 0750 by Clark Rayo RN  Outcome: Ongoing  8/30/2021 0218 by Dennie Chow, RN  Outcome: Ongoing  Goal: Absence of signs or symptoms of impaired coagulation  Description: Absence of signs or symptoms of impaired coagulation  8/30/2021 1215 by Saad Osorio RN  Outcome: Completed  8/30/2021 0750 by Calrk Rayo RN  Outcome: Ongoing  8/30/2021 0218 by Dennie Chow, RN  Outcome: Ongoing

## 2021-08-30 NOTE — PLAN OF CARE
Problem: Pain:  Goal: Control of acute pain  Description: Control of acute pain  8/30/2021 0750 by Ana Luisa Rayo RN  Outcome: Ongoing  8/30/2021 0218 by Pio Wylie RN  Outcome: Ongoing  Goal: Control of chronic pain  Description: Control of chronic pain  8/30/2021 0750 by Ana Luisa Rayo RN  Outcome: Ongoing  8/30/2021 0218 by Pio Wylie RN  Outcome: Ongoing     Problem: Discharge Planning:  Goal: Discharged to appropriate level of care  Description: Discharged to appropriate level of care  8/30/2021 0750 by Ana Luisa Rayo RN  Outcome: Ongoing  8/30/2021 0218 by Pio Wylie RN  Outcome: Ongoing     Problem: Fluid Volume - Imbalance:  Goal: Absence of postpartum hemorrhage signs and symptoms  Description: Absence of postpartum hemorrhage signs and symptoms  8/30/2021 0750 by Ana Luisa Rayo RN  Outcome: Ongoing  8/30/2021 0218 by Pio Wylie RN  Outcome: Ongoing     Problem: Infection - Surgical Site:  Goal: Will show no infection signs and symptoms  Description: Will show no infection signs and symptoms  8/30/2021 0750 by Ana Luisa Rayo RN  Outcome: Ongoing  8/30/2021 0218 by Pio Wylie RN  Outcome: Ongoing     Problem: Mood - Altered:  Goal: Mood stable  Description: Mood stable  8/30/2021 0750 by Ana Luisa Rayo RN  Outcome: Ongoing  8/30/2021 0218 by Pio Wylie RN  Outcome: Ongoing     Problem: Pain - Acute:  Goal: Pain level will decrease  Description: Pain level will decrease  8/30/2021 0750 by Ana Luisa Rayo RN  Outcome: Ongoing  8/30/2021 0218 by Pio Wylie RN  Outcome: Ongoing     Problem: Venous Thromboembolism:  Goal: Will show no signs or symptoms of venous thromboembolism  Description: Will show no signs or symptoms of venous thromboembolism  8/30/2021 0750 by Ana Luisa Rayo RN  Outcome: Ongoing  8/30/2021 0218 by Pio Wylie RN  Outcome: Ongoing  Goal: Absence of signs or symptoms of impaired coagulation  Description: Absence of signs or symptoms of impaired coagulation  8/30/2021 0750 by Soto Rayo RN  Outcome: Ongoing  8/30/2021 0218 by Ct Killian RN  Outcome: Ongoing

## 2021-08-30 NOTE — DISCHARGE SUMMARY
Obstetric Discharge Summary    Admitting Diagnosis  IUP 39.6 weeks  OB History        1    Para   1    Term   1            AB        Living   1       SAB        TAB        Ectopic        Molar        Multiple   0    Live Births   1                Reasons for Admission on 2021  6:14 PM  Normal labor [O80, Z37.9]      Prenatal Procedures  None    Intrapartum Procedures                  Delivery: See Labor and Delivery Summary       Postpartum Procedures  None    Postpartum/Operative Complications       [place] Data  Information for the patient's :  Eliz David [[id number]]   male   Birth Weight: 8 lb 3.4 oz (3.725 kg)     Discharge Currently in special care nursery has been monitored for TTN, disposition per peds         Discharge Information  Current Discharge Medication List      START taking these medications    Details   oxyCODONE (ROXICODONE) 5 MG immediate release tablet Take 1 tablet by mouth every 6 hours as needed for Pain for up to 3 days. Qty: 12 tablet, Refills: 0    Comments: Reduce doses taken as pain becomes manageable  Associated Diagnoses: S/P primary low transverse       ibuprofen (ADVIL;MOTRIN) 800 MG tablet Take 1 tablet by mouth every 6 hours as needed for Pain  Qty: 60 tablet, Refills: 0         CONTINUE these medications which have NOT CHANGED    Details   cetirizine (ZYRTEC) 10 MG tablet Take 10 mg by mouth daily      Prenatal Vit-Fe Fumarate-FA (PRENATAL 19 PO) Take by mouth           OBGYN Attending Progress Note  POD#3 s/p PLTCD 2/2 fetal intolerance to labor   QBL 780mL    S: No complaints, argenis po, pain controlled by meds, + ambulation, +flatus, voiding spontaneously, lochia similar to menses.      O: /66   Pulse 79   Temp 98.2 °F (36.8 °C)   Resp 16   Ht 5' 4\" (1.626 m)   Wt 181 lb (82.1 kg)   SpO2 98% Comment: continuous SpO2 monitoring discontinued at this time  Breastfeeding Unknown   BMI 31.07 kg/m²   Abd - soft, fundus firm below umbilicus, incision c/d/i w dermabond, healing well  Ext warm well perfused    WBC/Hgb/Hct/Plts:  12.3/10.4/31.8/197 (08/29 0627)    A/P: POD #3  1. Recovering well, no complaints  2. Meeting postop milestones. 3. Lift restrictions and pelvic rest x 6 wk reviewed   4. Rx for Ibuprofen and roxicodone provided prn moderate to severe pain  5. Male infant remains in SCN- circ pending  6. Pt is stable, discharge home today. No discharge procedures on file. Discharge to: Home  Follow up in 7-10d at [facility].   Condition stable    Discharge Date: 8/30/21 Time: 1100      Comments

## 2021-08-30 NOTE — FLOWSHEET NOTE
Discharge Phone Call Log  Patient Name: Jj Garcia     [facility] Care Provider: Baldo Dee DO Discharge Date: 2021    Disposition of baby:    Phone Number: [phone] (home)     Attempts to Contact:  Date:    Nurse  Date:    Nurse  Date:    Nurse    1. Now that you are at home is your pain being well controlled? Y/N   What pain reducing measures are you using? ____________________________________        Information for the patient's :  Saranya Ramos [[id number]]   Delivery Method: , Low Transverse     2. Are you currently  having any infant feeding issues? Y/N _____________________________ If yes, please explain: __________________________________________________________________  3. If breastfeeding, were you satisfied with the breastfeeding support services offered? Y/N  4.  Have you had to supplement? Y/N If yes, please explain: _____________________________________________________       Did you supplement while in the hospital, or begin formula supplementation at home?________________________________________  5. Did your OB provider offer you information about the benefits of breastfeeding during your prenatal visits? Y/N  6.  Have you made or have you already had your first appointment with the baby's doctor? Y/N If no, do you know when to schedule it? Y/N   7.  Have you scheduled your follow-up appointment? Y/N  If no, do you know when to schedule it? Y/N  8. Did staff discuss safe sleep during your stay? Y/N  Did you see the wall cling posted in your room explaining how to keep you and your baby safe? Y/N  10. Did your nurses and physicians include you in the plan of care, communicating with you respectfully? Y/N If no, please explain __________________________  11. Is there anyone in particular you would like to mention who provided care for you? ________________________________  12. Did your discharge occur in a timely manner?   Y/N If no, please explain __________________________  13. Do you have any other questions or concerns I can address today?  Y/N  __________________________________________________      Teaching During interview :_____________________________________________  ___________________________RN       Date:______________Time:________________

## 2021-09-10 RX ORDER — ONDANSETRON 4 MG/1
4 TABLET, ORALLY DISINTEGRATING ORAL EVERY 8 HOURS PRN
Status: CANCELLED | OUTPATIENT
Start: 2021-09-10

## 2021-09-10 RX ORDER — OXYCODONE HYDROCHLORIDE AND ACETAMINOPHEN 5; 325 MG/1; MG/1
2 TABLET ORAL EVERY 4 HOURS PRN
Status: CANCELLED | OUTPATIENT
Start: 2021-09-10

## 2021-09-10 RX ORDER — ONDANSETRON 2 MG/ML
4 INJECTION INTRAMUSCULAR; INTRAVENOUS EVERY 6 HOURS PRN
Status: CANCELLED | OUTPATIENT
Start: 2021-09-10

## 2021-09-10 RX ORDER — IBUPROFEN 200 MG
800 TABLET ORAL EVERY 8 HOURS PRN
Status: CANCELLED | OUTPATIENT
Start: 2021-09-10

## 2021-09-10 RX ORDER — ACETAMINOPHEN 325 MG/1
650 TABLET ORAL EVERY 4 HOURS PRN
Status: CANCELLED | OUTPATIENT
Start: 2021-09-10

## 2021-09-10 RX ORDER — SODIUM CHLORIDE 9 MG/ML
INJECTION, SOLUTION INTRAVENOUS CONTINUOUS
Status: CANCELLED | OUTPATIENT
Start: 2021-09-10

## 2021-09-10 RX ORDER — OXYCODONE HYDROCHLORIDE AND ACETAMINOPHEN 5; 325 MG/1; MG/1
1 TABLET ORAL EVERY 4 HOURS PRN
Status: CANCELLED | OUTPATIENT
Start: 2021-09-10

## 2021-09-10 RX ORDER — KETOROLAC TROMETHAMINE 30 MG/ML
30 INJECTION, SOLUTION INTRAMUSCULAR; INTRAVENOUS EVERY 6 HOURS PRN
Status: CANCELLED | OUTPATIENT
Start: 2021-09-10 | End: 2021-09-15

## 2021-09-11 ENCOUNTER — HOSPITAL ENCOUNTER (INPATIENT)
Age: 26
LOS: 2 days | Discharge: HOME | DRG: 769 | End: 2021-09-13
Attending: OBSTETRICS & GYNECOLOGY | Admitting: OBSTETRICS & GYNECOLOGY
Payer: COMMERCIAL

## 2021-09-11 DIAGNOSIS — T81.49XA WOUND INFECTION AFTER SURGERY: Primary | ICD-10-CM

## 2021-09-11 LAB
A/G RATIO: 1 (ref 1.1–2.2)
ALBUMIN SERPL-MCNC: 3.8 G/DL (ref 3.4–5)
ALP BLD-CCNC: 133 U/L (ref 40–129)
ALT SERPL-CCNC: 12 U/L (ref 10–40)
ANION GAP SERPL CALCULATED.3IONS-SCNC: 11 MMOL/L (ref 3–16)
AST SERPL-CCNC: 15 U/L (ref 15–37)
BASOPHILS ABSOLUTE: 0.1 K/UL (ref 0–0.2)
BASOPHILS RELATIVE PERCENT: 0.9 %
BILIRUB SERPL-MCNC: <0.2 MG/DL (ref 0–1)
BUN BLDV-MCNC: 11 MG/DL (ref 7–20)
CALCIUM SERPL-MCNC: 9.4 MG/DL (ref 8.3–10.6)
CHLORIDE BLD-SCNC: 104 MMOL/L (ref 99–110)
CO2: 25 MMOL/L (ref 21–32)
CREAT SERPL-MCNC: 0.7 MG/DL (ref 0.6–1.1)
EOSINOPHILS ABSOLUTE: 0.3 K/UL (ref 0–0.6)
EOSINOPHILS RELATIVE PERCENT: 1.9 %
GFR AFRICAN AMERICAN: >60
GFR NON-AFRICAN AMERICAN: >60
GLOBULIN: 3.8 G/DL
GLUCOSE BLD-MCNC: 100 MG/DL (ref 70–99)
HCT VFR BLD CALC: 33.1 % (ref 36–48)
HEMOGLOBIN: 10.6 G/DL (ref 12–16)
LYMPHOCYTES ABSOLUTE: 3 K/UL (ref 1–5.1)
LYMPHOCYTES RELATIVE PERCENT: 20.2 %
MCH RBC QN AUTO: 27.8 PG (ref 26–34)
MCHC RBC AUTO-ENTMCNC: 31.9 G/DL (ref 31–36)
MCV RBC AUTO: 87.1 FL (ref 80–100)
MONOCYTES ABSOLUTE: 1.1 K/UL (ref 0–1.3)
MONOCYTES RELATIVE PERCENT: 7.7 %
NEUTROPHILS ABSOLUTE: 10.2 K/UL (ref 1.7–7.7)
NEUTROPHILS RELATIVE PERCENT: 69.3 %
PDW BLD-RTO: 16.4 % (ref 12.4–15.4)
PLATELET # BLD: 413 K/UL (ref 135–450)
PMV BLD AUTO: 7.3 FL (ref 5–10.5)
POTASSIUM REFLEX MAGNESIUM: 3.8 MMOL/L (ref 3.5–5.1)
RBC # BLD: 3.79 M/UL (ref 4–5.2)
SEDIMENTATION RATE, ERYTHROCYTE: 28 MM/HR (ref 0–20)
SODIUM BLD-SCNC: 140 MMOL/L (ref 136–145)
TOTAL PROTEIN: 7.6 G/DL (ref 6.4–8.2)
WBC # BLD: 14.8 K/UL (ref 4–11)

## 2021-09-11 PROCEDURE — 86140 C-REACTIVE PROTEIN: CPT

## 2021-09-11 PROCEDURE — 86403 PARTICLE AGGLUT ANTBDY SCRN: CPT

## 2021-09-11 PROCEDURE — 6360000002 HC RX W HCPCS: Performed by: INTERNAL MEDICINE

## 2021-09-11 PROCEDURE — 80053 COMPREHEN METABOLIC PANEL: CPT

## 2021-09-11 PROCEDURE — 87070 CULTURE OTHR SPECIMN AEROBIC: CPT

## 2021-09-11 PROCEDURE — 87186 SC STD MICRODIL/AGAR DIL: CPT

## 2021-09-11 PROCEDURE — 2580000003 HC RX 258: Performed by: INTERNAL MEDICINE

## 2021-09-11 PROCEDURE — 0JD80ZZ EXTRACTION OF ABDOMEN SUBCUTANEOUS TISSUE AND FASCIA, OPEN APPROACH: ICD-10-PCS | Performed by: OBSTETRICS & GYNECOLOGY

## 2021-09-11 PROCEDURE — 87205 SMEAR GRAM STAIN: CPT

## 2021-09-11 PROCEDURE — 85025 COMPLETE CBC W/AUTO DIFF WBC: CPT

## 2021-09-11 PROCEDURE — 85652 RBC SED RATE AUTOMATED: CPT

## 2021-09-11 PROCEDURE — 6360000002 HC RX W HCPCS: Performed by: OBSTETRICS & GYNECOLOGY

## 2021-09-11 PROCEDURE — 2580000003 HC RX 258: Performed by: OBSTETRICS & GYNECOLOGY

## 2021-09-11 PROCEDURE — 87040 BLOOD CULTURE FOR BACTERIA: CPT

## 2021-09-11 PROCEDURE — 87077 CULTURE AEROBIC IDENTIFY: CPT

## 2021-09-11 PROCEDURE — 36415 COLL VENOUS BLD VENIPUNCTURE: CPT

## 2021-09-11 PROCEDURE — 1200000000 HC SEMI PRIVATE

## 2021-09-11 RX ORDER — SODIUM CHLORIDE 9 MG/ML
INJECTION, SOLUTION INTRAVENOUS CONTINUOUS
Status: DISCONTINUED | OUTPATIENT
Start: 2021-09-11 | End: 2021-09-13 | Stop reason: HOSPADM

## 2021-09-11 RX ORDER — IBUPROFEN 400 MG/1
800 TABLET ORAL EVERY 8 HOURS PRN
Status: DISCONTINUED | OUTPATIENT
Start: 2021-09-11 | End: 2021-09-13 | Stop reason: HOSPADM

## 2021-09-11 RX ORDER — OXYCODONE HYDROCHLORIDE AND ACETAMINOPHEN 5; 325 MG/1; MG/1
1 TABLET ORAL EVERY 4 HOURS PRN
Status: DISCONTINUED | OUTPATIENT
Start: 2021-09-11 | End: 2021-09-13 | Stop reason: HOSPADM

## 2021-09-11 RX ORDER — ONDANSETRON 4 MG/1
4 TABLET, ORALLY DISINTEGRATING ORAL EVERY 8 HOURS PRN
Status: DISCONTINUED | OUTPATIENT
Start: 2021-09-11 | End: 2021-09-13 | Stop reason: HOSPADM

## 2021-09-11 RX ORDER — KETOROLAC TROMETHAMINE 30 MG/ML
30 INJECTION, SOLUTION INTRAMUSCULAR; INTRAVENOUS EVERY 6 HOURS PRN
Status: DISCONTINUED | OUTPATIENT
Start: 2021-09-11 | End: 2021-09-13 | Stop reason: HOSPADM

## 2021-09-11 RX ORDER — OXYCODONE HYDROCHLORIDE AND ACETAMINOPHEN 5; 325 MG/1; MG/1
2 TABLET ORAL EVERY 4 HOURS PRN
Status: DISCONTINUED | OUTPATIENT
Start: 2021-09-11 | End: 2021-09-13 | Stop reason: HOSPADM

## 2021-09-11 RX ORDER — ONDANSETRON 2 MG/ML
4 INJECTION INTRAMUSCULAR; INTRAVENOUS EVERY 6 HOURS PRN
Status: DISCONTINUED | OUTPATIENT
Start: 2021-09-11 | End: 2021-09-13 | Stop reason: HOSPADM

## 2021-09-11 RX ORDER — ACETAMINOPHEN 325 MG/1
650 TABLET ORAL EVERY 4 HOURS PRN
Status: DISCONTINUED | OUTPATIENT
Start: 2021-09-11 | End: 2021-09-13 | Stop reason: HOSPADM

## 2021-09-11 RX ADMIN — ENOXAPARIN SODIUM 40 MG: 40 INJECTION SUBCUTANEOUS at 09:26

## 2021-09-11 RX ADMIN — KETOROLAC TROMETHAMINE 30 MG: 30 INJECTION, SOLUTION INTRAMUSCULAR; INTRAVENOUS at 09:26

## 2021-09-11 RX ADMIN — VANCOMYCIN HYDROCHLORIDE 1000 MG: 1 INJECTION, POWDER, LYOPHILIZED, FOR SOLUTION INTRAVENOUS at 03:18

## 2021-09-11 RX ADMIN — SODIUM CHLORIDE: 9 INJECTION, SOLUTION INTRAVENOUS at 16:05

## 2021-09-11 RX ADMIN — VANCOMYCIN HYDROCHLORIDE 1000 MG: 1 INJECTION, POWDER, LYOPHILIZED, FOR SOLUTION INTRAVENOUS at 16:07

## 2021-09-11 RX ADMIN — MEROPENEM 1000 MG: 1 INJECTION, POWDER, FOR SOLUTION INTRAVENOUS at 19:24

## 2021-09-11 RX ADMIN — SODIUM CHLORIDE: 9 INJECTION, SOLUTION INTRAVENOUS at 03:15

## 2021-09-11 ASSESSMENT — PAIN SCALES - GENERAL
PAINLEVEL_OUTOF10: 0
PAINLEVEL_OUTOF10: 1

## 2021-09-11 NOTE — PROGRESS NOTES
Dressing over  incision changed. Moderate amount of serosanguinous drainage noted on old dressing that pt states \"was from my doctors appointment earlier today\". 20G peripheral IV placed in pt's RFA. IVF and vancomycin started. Pt denies any other needs at this time.

## 2021-09-11 NOTE — CONSULTS
Consult placed    Who: Agcaoili  Date:9/11/2021,  Time:7:33 AM        Electronically signed by Rodger Ormond on 9/11/2021 at 7:33 AM

## 2021-09-11 NOTE — PROGRESS NOTES
ID consult received. Chart reviewed. Patient admitted with leukocytosis, high fever of 103 at home and postop wound infection. Currently on IV vancomycin. We will also add IV meropenem for empiric gram-negative anaerobic coverage. Patient allergic to penicillins    Also send blood cultures. Check sed rate and CRP    Will see patient tomorrow.   Full ID consult to follow      Lisa Oliveira MD, MPH  9/11/2021, 5:56 PM  [facility] Infectious Disease   [address] [place] [address], [address] [place] [address]  Office: [phone]  Fax: [phone]  Clinic days:  Tuesday & Thursday

## 2021-09-11 NOTE — H&P
Department of Obstetrics and Gynecology  Attending Obstetrics History and Physical        CHIEF COMPLAINT:  Post op incisional redness and drainage    HISTORY OF PRESENT ILLNESS:      The patient is a 27 y.o.  1 parity 1001 POD #15 s/p PLTCD (on 21) 2/2 fetal intolerance to labor she was readmitting with signs and symptoms of a post op wound infection. Pt reports her symptoms of fever Tmax 103F on Wednesday started and also at that time had redness/tenderness to her breast.  She was subsequently treated for mastitis that improved the next day. Her fever improved and her CD wound became very red and began draining moderate amounts of fluid. She was prescribed antibiotics on Wednesday evening and was seen in the office with Dr Kael Grady yesterday whom drained and packed her wound and readmitted her to [facility] for [facility] antibiotics. She states her pain is minimal only bothersome with touch. Has continued to drain moderate amounts of fluid. Denies chest pain, acute fevers/chills, nausea vomiting, rash. REVIEW OF SYSTEMS:  12 point ROS neg except mentioned in HPI    PHYSICAL EXAM:    GEN: NAD, pleasant and cooperative with exam  Lungs: No labored breathing, even respirations  Heart: S1S2+  Abdomen: soft, appropriate haydee-incisional TTP, serosanguinous fluid draining midline with skin separation 2.5cm x 2.5 cm depth. No tracking otherwise. Irrigated with H202 diluted in H20, debrided wound, area bled with good granulation tissue noted. Packed with 1/4\" iodoform packing. : mild lochia  EXT:  Warm well perfused    WBC/Hgb/Hct/Plts:  14.8/10.6/33.1/413 ( 0159)    ASSESSMENT AND PLAN:    The patient is a 27 y.o.  1 parity 26  HD#1/ POD #15     -Post op wound infection    Plan:   1. Admit to med/surg under GYN team  2. Wound debrided and repacked with iodoform, for daily changes. 3.  IV antibiotis started with Vancomycin  4. ID consulted - appreciate input. Pt with multiple allergies  5. Consult Wound care. 6. Regular diet. 6. Monitor for improvement and healing.

## 2021-09-11 NOTE — PROGRESS NOTES
Pt arrived to room C345 via DA from home. Patient oriented to room, call light, bed rails, phone, lights and bathroom. Patient instructed about the schedule of the day including: vital sign frequency, lab draws, possible tests, frequency of MD and staff rounds, including RN/MD rounding together at bedside, daily weights, and I &O's. Patient instructed about prescribed diet, how to use 8MENU, and television. Pt denies any other needs at this time.

## 2021-09-12 ENCOUNTER — APPOINTMENT (OUTPATIENT)
Dept: CT IMAGING | Age: 26
DRG: 769 | End: 2021-09-12
Attending: OBSTETRICS & GYNECOLOGY
Payer: COMMERCIAL

## 2021-09-12 LAB
A/G RATIO: 0.9 (ref 1.1–2.2)
ALBUMIN SERPL-MCNC: 3.3 G/DL (ref 3.4–5)
ALP BLD-CCNC: 106 U/L (ref 40–129)
ALT SERPL-CCNC: 12 U/L (ref 10–40)
ANION GAP SERPL CALCULATED.3IONS-SCNC: 11 MMOL/L (ref 3–16)
AST SERPL-CCNC: 13 U/L (ref 15–37)
BASOPHILS ABSOLUTE: 0.1 K/UL (ref 0–0.2)
BASOPHILS RELATIVE PERCENT: 1.3 %
BILIRUB SERPL-MCNC: <0.2 MG/DL (ref 0–1)
BUN BLDV-MCNC: 11 MG/DL (ref 7–20)
C-REACTIVE PROTEIN: 186.1 MG/L (ref 0–5.1)
CALCIUM SERPL-MCNC: 9 MG/DL (ref 8.3–10.6)
CHLORIDE BLD-SCNC: 105 MMOL/L (ref 99–110)
CO2: 25 MMOL/L (ref 21–32)
CREAT SERPL-MCNC: 0.7 MG/DL (ref 0.6–1.1)
EOSINOPHILS ABSOLUTE: 0.2 K/UL (ref 0–0.6)
EOSINOPHILS RELATIVE PERCENT: 3 %
GFR AFRICAN AMERICAN: >60
GFR NON-AFRICAN AMERICAN: >60
GLOBULIN: 3.5 G/DL
GLUCOSE BLD-MCNC: 111 MG/DL (ref 70–99)
HCT VFR BLD CALC: 32.8 % (ref 36–48)
HEMOGLOBIN: 10.8 G/DL (ref 12–16)
LYMPHOCYTES ABSOLUTE: 2 K/UL (ref 1–5.1)
LYMPHOCYTES RELATIVE PERCENT: 25 %
MCH RBC QN AUTO: 28 PG (ref 26–34)
MCHC RBC AUTO-ENTMCNC: 32.9 G/DL (ref 31–36)
MCV RBC AUTO: 85.1 FL (ref 80–100)
MONOCYTES ABSOLUTE: 0.4 K/UL (ref 0–1.3)
MONOCYTES RELATIVE PERCENT: 5.1 %
NEUTROPHILS ABSOLUTE: 5.2 K/UL (ref 1.7–7.7)
NEUTROPHILS RELATIVE PERCENT: 65.6 %
PDW BLD-RTO: 16.9 % (ref 12.4–15.4)
PLATELET # BLD: 406 K/UL (ref 135–450)
PMV BLD AUTO: 7.6 FL (ref 5–10.5)
POTASSIUM REFLEX MAGNESIUM: 3.9 MMOL/L (ref 3.5–5.1)
RBC # BLD: 3.85 M/UL (ref 4–5.2)
SODIUM BLD-SCNC: 141 MMOL/L (ref 136–145)
TOTAL PROTEIN: 6.8 G/DL (ref 6.4–8.2)
WBC # BLD: 7.9 K/UL (ref 4–11)

## 2021-09-12 PROCEDURE — 36415 COLL VENOUS BLD VENIPUNCTURE: CPT

## 2021-09-12 PROCEDURE — 6360000004 HC RX CONTRAST MEDICATION: Performed by: OBSTETRICS & GYNECOLOGY

## 2021-09-12 PROCEDURE — 74177 CT ABD & PELVIS W/CONTRAST: CPT

## 2021-09-12 PROCEDURE — 6360000002 HC RX W HCPCS: Performed by: INTERNAL MEDICINE

## 2021-09-12 PROCEDURE — 6360000002 HC RX W HCPCS: Performed by: OBSTETRICS & GYNECOLOGY

## 2021-09-12 PROCEDURE — 99254 IP/OBS CNSLTJ NEW/EST MOD 60: CPT | Performed by: INTERNAL MEDICINE

## 2021-09-12 PROCEDURE — 85025 COMPLETE CBC W/AUTO DIFF WBC: CPT

## 2021-09-12 PROCEDURE — 2580000003 HC RX 258: Performed by: OBSTETRICS & GYNECOLOGY

## 2021-09-12 PROCEDURE — 2580000003 HC RX 258: Performed by: INTERNAL MEDICINE

## 2021-09-12 PROCEDURE — 80053 COMPREHEN METABOLIC PANEL: CPT

## 2021-09-12 PROCEDURE — 1200000000 HC SEMI PRIVATE

## 2021-09-12 RX ADMIN — MEROPENEM 1000 MG: 1 INJECTION, POWDER, FOR SOLUTION INTRAVENOUS at 10:30

## 2021-09-12 RX ADMIN — IOPAMIDOL 75 ML: 755 INJECTION, SOLUTION INTRAVENOUS at 15:03

## 2021-09-12 RX ADMIN — VANCOMYCIN HYDROCHLORIDE 1000 MG: 1 INJECTION, POWDER, LYOPHILIZED, FOR SOLUTION INTRAVENOUS at 14:39

## 2021-09-12 RX ADMIN — VANCOMYCIN HYDROCHLORIDE 1250 MG: 10 INJECTION, POWDER, LYOPHILIZED, FOR SOLUTION INTRAVENOUS at 22:46

## 2021-09-12 RX ADMIN — SODIUM CHLORIDE: 9 INJECTION, SOLUTION INTRAVENOUS at 06:40

## 2021-09-12 RX ADMIN — VANCOMYCIN HYDROCHLORIDE 1000 MG: 1 INJECTION, POWDER, LYOPHILIZED, FOR SOLUTION INTRAVENOUS at 02:50

## 2021-09-12 RX ADMIN — MEROPENEM 1000 MG: 1 INJECTION, POWDER, FOR SOLUTION INTRAVENOUS at 01:52

## 2021-09-12 RX ADMIN — ENOXAPARIN SODIUM 40 MG: 40 INJECTION SUBCUTANEOUS at 10:28

## 2021-09-12 RX ADMIN — MEROPENEM 1000 MG: 1 INJECTION, POWDER, FOR SOLUTION INTRAVENOUS at 19:10

## 2021-09-12 RX ADMIN — SODIUM CHLORIDE: 9 INJECTION, SOLUTION INTRAVENOUS at 22:30

## 2021-09-12 ASSESSMENT — ENCOUNTER SYMPTOMS
RHINORRHEA: 0
ABDOMINAL PAIN: 0
EYE REDNESS: 0
TROUBLE SWALLOWING: 0
DIARRHEA: 0
COLOR CHANGE: 0
CHEST TIGHTNESS: 0
FACIAL SWELLING: 0
PHOTOPHOBIA: 0
NAUSEA: 0
APNEA: 0
COUGH: 0
CHOKING: 0
SHORTNESS OF BREATH: 0
STRIDOR: 0
BLOOD IN STOOL: 0
EYE DISCHARGE: 0

## 2021-09-12 NOTE — PROGRESS NOTES
Pt is a/o x4. VSS. Assessment as charted. - Pt had a moderate amount of serosanguinous drainage on her lower and ix dressing- the wound was cleansed and dressing was changed. Pt denies any pain medicine at this time and is having a good appetite, active bowel sounds, and is passing flatus/BMs. Pt is currently resting in her bed that is locked and in its lowesr position w/ her call light with in reach, non-skid socks on, and bed alarm off d/t pt being independent. Pt denies any other needs at this time.

## 2021-09-12 NOTE — PROGRESS NOTES
Physician Progress Note      Pushpa Chapman  [facility] #:                  [id number]  :                       1995  ADMIT DATE:       2021 1:35 AM  DISCH DATE:  RESPONDING  PROVIDER #:        Coleman Mccoy MD          QUERY TEXT:    Patient admitted with Post op Wound infection. Per H&P noted documentation of   debridement. To accurately reflect the procedure performed please document if   debridement was excisional or nonexcisional and the deepest depth of tissue   removed as down to and including: The medical record reflects the following:  Risk Factors: DA for post op wound infection    Clinical Indicators: Per H&P- \"soft, appropriate haydee-incisional TTP,   serosanguinous fluid draining midline with skin separation 2.5cm x 2.5 cm   depth. No tracking otherwise. Irrigated with H202 diluted in H20, debrided   wound, area bled with good granulation tissue noted. Packed with 1/4\"   iodoform packing. Marlene Daiana Marlene Daiana Wound debrided and repacked with iodoform, for daily   changes. \"    Treatment: Debridement, Abx, Wound care consult, ongoing supportive care    thank you,  neal min RN, BSN  [email or web address]  Options provided:  -- Nonexcisional debridement of skin, during procedure on , please specify   site. -- Excisional debridement of skin, during procedure on , please specify   site. -- Nonexcisional debridement of subcutaneous tissue, was performed during   procedure on , please specify site. -- Excisional debridement of subcutaneous tissue, was performed during   procedure on , please specify site. -- Nonexcisional debridement of fascia, was performed during procedure on   , please specify site. -- Excisional debridement of fascia, was performed during procedure on ,   please specify site.   -- Other - I will add my own diagnosis  -- Disagree - Not applicable / Not valid  -- Disagree - Clinically unable to determine / Unknown  -- Refer to Clinical Documentation Reviewer    PROVIDER RESPONSE TEXT:    Non-excisional debridement of subcutaneous tissue of abdominal  wound    Query created by: Estuardo Bowman on 2021 3:38 PM      Electronically signed by:  Roxane Dunn MD 2021 11:25 AM

## 2021-09-12 NOTE — CONSULTS
Infectious Diseases   Consult Note        Admission Date: 9/11/2021  Hospital Day: Hospital Day: 2   Attending: Toma Chirinos MD  Date of service: 9/12/21     Reason for admission: Wound infection after surgery [T81.49XA]  Wound infection after surgery [T81.49XA]    Chief complaint/ Reason for consult: Postpartum infection    Microbiology:        I have reviewed allavailable micro lab data and cultures    · Blood culture (2/2) - collected on 9/11/2021: in process        Antibiotics and immunizations:       Current antibiotics: All antibiotics and their doses were reviewed by me    Recent Abx Admin                   meropenem (MERREM) 1,000 mg in sodium chloride 0.9 % 100 mL IVPB (mini-bag) (mg) 1,000 mg New Bag 09/12/21 1030     1,000 mg New Bag  0152     1,000 mg New Bag 09/11/21 1924    vancomycin 1000 mg IVPB in 250 mL D5W addavial (mg) 1,000 mg New Bag 09/12/21 0250     1,000 mg New Bag 09/11/21 1607                  Immunization History: All immunization history was reviewed by me today. There is no immunization history on file for this patient. Known drug allergies: All allergies were reviewed and updated    Allergies   Allergen Reactions    Pseudoephedrine     Septra [Sulfamethoxazole-Trimethoprim] Swelling     Swelling of her ears    Trimethoprim     Pcn [Penicillins] Hives and Rash       Social history:     Social History:  All social andepidemiologic history was reviewed and updated by me today as needed. · Tobacco use:   reports that she has never smoked. She has never used smokeless tobacco.  · Alcohol use:   reports previous alcohol use. · Currently lives in: [address] [place] [address]  ·  reports no history of drug use.      COVID VACCINATION AND LAB RESULT RECORDS:     Internal Administration   First Dose      Second Dose           Last COVID Lab No results found for: SARS-COV-2, SARS-COV-2 RNA, SARS-COV-2, SARS-COV-2, SARS-COV-2 BY PCR, SARS-COV-2, SARS-COV-2, SARS-COV-2 Assessment:     The patient is a 27 y.o. old female who  has a past medical history of S/P LASIK surgery of both eyes and [place] teeth extracted. with following problems:    · Postpartum wound infection  · High fever of 103 at home  · Significant bandemia with white cell count of 14,800  · Markedly elevated CRP of 186.1  · Elevated sed rate of 28  · History of low transverse  section on 2021  · Non-smoker  · Obesity Class 1* due to excess calorie intake       Discussion:      The patient was admitted with postpartum fever and increasing white cell count of 14,300. Blood cultures and wound cultures have been sent. Sed rate and CRP are markedly elevated    She had a CT scan of abdomen and pelvis with IV contrast done earlier today. CT images reviewed it showed some postsurgical changes but no deep abscess. Plan:     Diagnostic Workup:    · I had reviewed her chart yesterday and had ordered blood cultures and incision site wound culture  · Continue to follow  fever curve, WBC count and blood cultures. · Continue to monitor blood counts, liver and renal function. · Follow-up on the abdominal wound culture    Antimicrobials:    · Will continue empiric IV vancomycin  Check Vancomycin trough before the 5th dose. Target vancomycin trough level of 15-20  mg/L or vancomycin area under curve (AUC) of 400-600 mg*h/L by Bayesian modeling method. If dosing vancomycin based on trough levels, keep vancomycin trough below 20 at all times. Avoid increasing the dose of vancomycin above a total of 4 grams in a 24-hour period in patients younger than 45 years and above 3 grams in a 24-hour period in a patient of age 39 years or older. Continue to monitor serum creatinine and Vanco levels closely, while the patient is on I/v Vancomycin. · I had ordered a empiric IV meropenem yesterday after reviewing her chart.   We will continue IV meropenem 1 g every 8 hours for now  · Hoping to be able to switch her to oral antibiotics at discharge  · Pain control  · We will follow up on the culture results and clinical progress and will make further recommendations accordingly. · Continue close vitals monitoring. · Maintain good glycemic control. · Fall precautions. Aspiration precautions. · Continue to watch for new fever or diarrhea. · DVT prophylaxis. · Discussed all above with patient and RN. · Discussed with patient's  at bedside      Drug Monitoring:    · Continue monitoring for antibiotic toxicity as follows: CBC, CMP, vancomycin trough  · Continue to watch for following: new or worsening fever, new hypotension, hives, lip swelling and redness or purulence at vascular access sites. I/v access Management:    · Continue to monitor i.v access sites for erythema, induration, discharge or tenderness. · As always, continue efforts to minimize tubes/lines/drains as clinically appropriate to reduce chances of line associated infections. Patient education and counseling:        · The patient was educated in detail about the side-effects of various antibiotics and things to watch for like new rashes, lip swelling, severe reaction, worsening diarrhea, break through fever etc.  · Discussed patient's condition and what to expect. All of the patient's questions were addressed in a satisfactory manner and patient verbalized understanding all instructions. Level of complexity of visit: High     Risk of Complications/Morbidity: High     · Illness(es)/ Infection present that pose threat to bodily function. · There is potential for severe exacerbation of infection/side effects of treatment. · Therapy requires intensive monitoring for antimicrobial agent toxicity. Weight loss counseling:    Extensive weight loss counseling was done. It is important to set a realistic weight loss goal. First goal should be to avoid gaining more weight and staying at current weight (or within 5 percent).  People at high risk of developing diabetes who are able to lose 5 percent of their body weight and maintain this weight will reduce their risk of developing diabetes by about 50 percent and reduce their blood pressure. Losing more than 15 percent of  body weight and staying at this weight is an extremely good result, even if you never reach your \"dream\" or \"ideal\" weight. Lifestyle changes including changing eating habits, substituting excess carbohydrates with proteins, stress reduction, using self-help programs like Weight Watchers®, Overeaters Anonymous®, and Take Off Pounds Sensibly (TOPS)© , following DASH diet and increasing exercise or walking briskly daily for half hour to and hour 5-7 days a week was suggested among other measures. Information was given about various weight loss education programs and their websites like www.cdc.gov/healthyweight, www.choosemyplate.gov and www.health.gov/dietaryguidelines/    Thank you for involving me in the care of your patient. I will continue to follow. If you have anyadditional questions, please do not hesitate to contact me. Subjective:     Presenting complaint in ER:     No chief complaint on file. HPI: Sajan Luong is a 27 y.o. female patient, who was seen at the request of Dr. Nikki Dill MD.    History was obtained from chart review, RN and the patient    The patient was admitted on 2021. I have been consulted to see the patient for above mentioned reason(s). The patient has multiple medical comorbidities, and presented to the hospital for concerns for postoperative infection. The patient underwent primary low transverse  section on 2021 due to recurrent prolonged decelerations and fetal intolerance to labor at 39 weeks 6 days of pregnancy on 2021. She was discharged on 2021. The patient started having high fever up to 103 on Wednesday and also started having redness and tenderness of her breast areas.   The patient was subsequently treated for mastitis as an outpatient by obstetrics and she showed some improvement but then started having redness and discharge from the  incision site. The patient was admitted to [facility].  She had elevated white cell count of 14,800. I have been asked for my opinion for management for this patient. Past Medical History: All past medical history reviewed today. Past Medical History:   Diagnosis Date    S/P LASIK surgery of both eyes     [place] teeth extracted          Past Surgical History: All pastsurgical history was reviewed today. Past Surgical History:   Procedure Laterality Date     SECTION N/A 2021     SECTION performed by Baldo [facility] DO at [facility]     SECTION, LOW TRANSVERSE N/A 2021    NRFHT    LASIK Bilateral 2016    WISDOM TOOTH EXTRACTION           Family History: All family history was reviewed today. Problem Relation Age of Onset    No Known Problems Mother     Diabetes Father     Cancer Father 54        skin, prostate    Pacemaker Father     Heart Disease Father     Prostate Cancer Father     Arrhythmia Father         Atrial fibrillation    No Known Problems Sister     Heart Disease Maternal Grandfather     No Known Problems Sister          Medications: All current and past medications were reviewed. Medications Prior to Admission: ibuprofen (ADVIL;MOTRIN) 800 MG tablet, Take 1 tablet by mouth every 6 hours as needed for Pain  cetirizine (ZYRTEC) 10 MG tablet, Take 10 mg by mouth daily  Prenatal Vit-Fe Fumarate-FA (PRENATAL 19 PO), Take by mouth     enoxaparin  40 mg SubCUTAneous Daily    vancomycin  1,000 mg IntraVENous Q12H    meropenem  1,000 mg IntraVENous Q8H          REVIEW OF SYSTEMS:      Review of Systems   Constitutional: Positive for fatigue and fever. Negative for chills, diaphoresis and unexpected weight change.    HENT: Negative for congestion, ear discharge, ear pain, facial swelling, hearing loss, rhinorrhea and trouble swallowing. Eyes: Negative for photophobia, discharge, redness and visual disturbance. Respiratory: Negative for apnea, cough, choking, chest tightness, shortness of breath and stridor. Cardiovascular: Negative for chest pain and palpitations. Gastrointestinal: Negative for abdominal pain, blood in stool, diarrhea and nausea. Endocrine: Negative for polydipsia, polyphagia and polyuria. Genitourinary: Negative for difficulty urinating, dysuria, frequency, hematuria, menstrual problem and vaginal discharge. Musculoskeletal: Negative for arthralgias, joint swelling, myalgias and neck stiffness. Skin: Negative for color change and rash. Had a severe redness and tenderness with discharge at the  incision site, it is improving today   Allergic/Immunologic: Negative for immunocompromised state. Neurological: Negative for dizziness, seizures, speech difficulty, light-headedness and headaches. Hematological: Negative for adenopathy. Psychiatric/Behavioral: Negative for agitation, hallucinations and suicidal ideas. Objective:       PHYSICAL EXAM:      Vitals:   Vitals:    21 0711 21 1412 21 2239 21 1037   BP: 114/73 117/81 116/67 118/76   Pulse: 90 76 83 89   Resp: 18 16 14 16   Temp: 98.3 °F (36.8 °C) 98.3 °F (36.8 °C) 98.2 °F (36.8 °C) 98.2 °F (36.8 °C)   TempSrc: Oral Oral Oral Oral   SpO2: 97% 98% 97% 97%       Physical Exam  Vitals and nursing note reviewed. Constitutional:       General: She is not in acute distress. Appearance: She is well-developed. She is not diaphoretic. HENT:      Head: Normocephalic. Right Ear: External ear normal.      Left Ear: External ear normal.      Nose: Nose normal.   Eyes:      General: No scleral icterus. Right eye: No discharge. Left eye: No discharge.       Conjunctiva/sclera: Conjunctivae normal.      Pupils: Pupils are equal, round, and reactive to light. Cardiovascular:      Rate and Rhythm: Normal rate and regular rhythm. Heart sounds: No murmur heard. No friction rub. Pulmonary:      Effort: Pulmonary effort is normal.      Breath sounds: No stridor. No wheezing or rales. Chest:      Chest wall: No tenderness. Abdominal:      Palpations: Abdomen is soft. There is no mass. Tenderness: There is no abdominal tenderness. There is no guarding or rebound. Musculoskeletal:         General: No tenderness. Cervical back: Normal range of motion and neck supple. Lymphadenopathy:      Cervical: No cervical adenopathy. Skin:     General: Skin is warm and dry. Findings: No erythema or rash. Comments: Some purulence and tenderness noted at the  incision site, packing is in place at 2 places   Neurological:      Mental Status: She is alert and oriented to person, place, and time. Motor: No abnormal muscle tone. Psychiatric:         Judgment: Judgment normal.          Lines: All vascular access sites are healthy with no local erythema, discharge or tenderness. Intake and output:     I/O last 3 completed shifts: In: 4166.3 [P.O.:1500; I.V.:1716.2; IV Piggyback:950.1]  Out: -     Lab Data:   All available labs were reviewed by me today.      CBC:   Recent Labs     21  01521  1025   WBC 14.8* 7.9   RBC 3.79* 3.85*   HGB 10.6* 10.8*   HCT 33.1* 32.8*    406   MCV 87.1 85.1   MCH 27.8 28.0   MCHC 31.9 32.9   RDW 16.4* 16.9*        BMP:  Recent Labs     21  0159 21  1025    141   K 3.8 3.9    105   CO2 25 25   BUN 11 11   CREATININE 0.7 0.7   CALCIUM 9.4 9.0   GLUCOSE 100* 111*        Hepatic FunctionPanel:   Lab Results   Component Value Date    ALKPHOS 106 2021    ALT 12 2021    AST 13 2021    PROT 6.8 2021    BILITOT <0.2 2021    LABALBU 3.3 2021       CPK: No results found for: CKTOTAL  ESR:   Lab Results   Component Value Date    SEDRATE 28 (H) 2021     CRP:   Lab Results   Component Value Date    .1 (H) 2021         Imaging: All pertinent images and reports for the current visit were reviewed by meduring this visit. No orders to display       Outside records:    Labs, Microbiology, Radiology and pertinent results from Care everywhere, if available, were reviewed as a part ofthe consultation. Problem list:       Patient Active Problem List   Diagnosis Code    Normal labor O80, Z37.9    H/O  section Z98.891    Fetal intolerance to labor, delivered, current hospitalization O77.9    Wound infection after surgery T81.49XA    Elevated sed rate R70.0    Bandemia D72.825    Postpartum fever O86.4    Elevated C-reactive protein (CRP) R79.82    Non-smoker Z78.9    Class 1 obesity due to excess calories with body mass index (BMI) of 31.0 to 31.9 in adult E66.09, Z68.31         Please note that this chart was generated using Dragon dictation software. Although every effort was made to ensure the accuracy of this automated transcription, some errors in transcription may have occurred inadvertently. If you may need any clarification, please do not hesitate to contact me through EPIC or at the phone number provided below with my electronic signature. Any pictures or media included in this note were obtained after taking informed verbal consent from the patient and with their approval to include those in the patient's medical record.       Demetrius Lovett MD, MPH  21, 10:12 AM EDT   [facility] Infectious Disease   [address] [place] [address], [address] [place] [address]  Office: [phone]  Fax: [phone]  Clinic days:  Tuesday & Thursday

## 2021-09-12 NOTE — CONSULTS
Pharmacy Note  Vancomycin Consult    Loretta Babinski is a 27 y.o. female started on Vancomycin for surgical site infection; consult received from Dr. Soledad Gautam to manage therapy. Also receiving the following antibiotics: Merrem . Allergies:  Pseudoephedrine, Septra [sulfamethoxazole-trimethoprim], Trimethoprim, and Pcn [penicillins]     Tmax: 98.2    Recent Labs     09/11/21  0159 09/12/21  1025   CREATININE 0.7 0.7       Recent Labs     09/11/21  0159 09/12/21  1025   WBC 14.8* 7.9       CrCl cannot be calculated (Unknown ideal weight.). Intake/Output Summary (Last 24 hours) at 9/12/2021 1513  Last data filed at 9/12/2021 0640  Gross per 24 hour   Intake 3866.28 ml   Output --   Net 3866.28 ml       Wt Readings from Last 1 Encounters:   08/27/21 181 lb (82.1 kg)         There is no height or weight on file to calculate BMI. Loading dose (critically ill or in ICU, require dialysis or renal replacement therapy): Vancomycin 25 mg/kg IVPB x 1 (maximum 3000 mg). Maintenance dose: 15 mg/kg (maximum: 2000 mg/dose and 4500 mg/day) starting at the next dosing interval determined by renal function  Pulse dose: fluctuating renal function, BRE, ESRD   Goal Vancomycin trough: 10-15 mcg/mL or 15-20 mcg/mL   Goal Vancomycin AUC: 400-600     Assessment/Plan:  Receiving 1000 mg q12h, calculated AUC is subtherapeutic of 371, will increase Vancomycin dose to 1250 mg q12h, with calculated AUC of 463. Vancomycin trough ordered for 9/13 2200. Timing of trough level will be determined based on culture results, renal function, and clinical response. Thank you for the consult.   Kaykay Adams, PharmD  9/12/2021 at 3:15 PM

## 2021-09-12 NOTE — PROGRESS NOTES
Department of Ob/Gyn  Attending Progress Note    POD 16  Abx D 2    Pt c/o mild abd pain at the incision site, continues to drain serosang fluid. No f/c, no n/v, argenis PO. Improvement in WBC    OBJECTIVE:           Physical Exam  VITALS:  /76   Pulse 89   Temp 98.2 °F (36.8 °C) (Oral)   Resp 16   SpO2 97%   NAD  RRR  CTAb  Abd: soft, mildly tender along the incision, no distention, decreased erythema. Dressing/packing removed which was full of serosanguinous fluid, no pus or odor. Wound irrigated with saline, including 3cm medial opening and 1cm left angle opening. Stitch cut from left angle. 1/4\" packing placed medially and at the left angle wet to dry with abd pad    DATA:  CBC with Differential:    Lab Results   Component Value Date    WBC 7.9 09/12/2021    RBC 3.85 09/12/2021    HGB 10.8 09/12/2021    HCT 32.8 09/12/2021     09/12/2021    MCV 85.1 09/12/2021    MCH 28.0 09/12/2021    MCHC 32.9 09/12/2021    RDW 16.9 09/12/2021    LYMPHOPCT 25.0 09/12/2021    MONOPCT 5.1 09/12/2021    BASOPCT 1.3 09/12/2021    MONOSABS 0.4 09/12/2021    LYMPHSABS 2.0 09/12/2021    EOSABS 0.2 09/12/2021    BASOSABS 0.1 09/12/2021     CMP:    Lab Results   Component Value Date     09/12/2021    K 3.9 09/12/2021     09/12/2021    CO2 25 09/12/2021    BUN 11 09/12/2021    CREATININE 0.7 09/12/2021    GFRAA >60 09/12/2021    AGRATIO 0.9 09/12/2021    LABGLOM >60 09/12/2021    GLUCOSE 111 09/12/2021    PROT 6.8 09/12/2021    LABALBU 3.3 09/12/2021    CALCIUM 9.0 09/12/2021    BILITOT <0.2 09/12/2021    ALKPHOS 106 09/12/2021    AST 13 09/12/2021    ALT 12 09/12/2021       ASSESSMENT AND PLAN:  POD 16 with postop wound infection and superficial wound separation. No purulent discharge at this time after about 20ml pus cleared from the wound in the office 9/10 prior to admission. Admission delayed due to no [facility] [place] beds. On vanc and meropenem IV with improvement in wbc.  Wound culture pending from the office and from [facility], blood cultures pending. Appreciate ID input, final consult still pending. Wound care team to assess. Will proceed with abd/pelvic CT. F/u with [address] [place] [address] 9/22/21 scheduled per [facility].

## 2021-09-12 NOTE — PROGRESS NOTES
Pt axo. Assessment completed as charted. Pt states pain with movement. PRN toradol given. Dressing to abd c/d/i. Pt denies nausea or additional needs. Will continue to monitor. Call light in reach.

## 2021-09-13 VITALS
SYSTOLIC BLOOD PRESSURE: 118 MMHG | DIASTOLIC BLOOD PRESSURE: 79 MMHG | RESPIRATION RATE: 16 BRPM | HEART RATE: 69 BPM | TEMPERATURE: 98 F | OXYGEN SATURATION: 98 %

## 2021-09-13 LAB
A/G RATIO: 1 (ref 1.1–2.2)
ALBUMIN SERPL-MCNC: 3.4 G/DL (ref 3.4–5)
ALP BLD-CCNC: 108 U/L (ref 40–129)
ALT SERPL-CCNC: 12 U/L (ref 10–40)
ANION GAP SERPL CALCULATED.3IONS-SCNC: 9 MMOL/L (ref 3–16)
ANISOCYTOSIS: ABNORMAL
AST SERPL-CCNC: 13 U/L (ref 15–37)
BANDED NEUTROPHILS RELATIVE PERCENT: 12 % (ref 0–7)
BASOPHILS ABSOLUTE: 0 K/UL (ref 0–0.2)
BASOPHILS RELATIVE PERCENT: 0 %
BILIRUB SERPL-MCNC: <0.2 MG/DL (ref 0–1)
BUN BLDV-MCNC: 10 MG/DL (ref 7–20)
CALCIUM SERPL-MCNC: 9 MG/DL (ref 8.3–10.6)
CHLORIDE BLD-SCNC: 107 MMOL/L (ref 99–110)
CO2: 26 MMOL/L (ref 21–32)
CREAT SERPL-MCNC: 0.7 MG/DL (ref 0.6–1.1)
EOSINOPHILS ABSOLUTE: 0.2 K/UL (ref 0–0.6)
EOSINOPHILS RELATIVE PERCENT: 2 %
GFR AFRICAN AMERICAN: >60
GFR NON-AFRICAN AMERICAN: >60
GLOBULIN: 3.3 G/DL
GLUCOSE BLD-MCNC: 95 MG/DL (ref 70–99)
HCT VFR BLD CALC: 33.3 % (ref 36–48)
HEMOGLOBIN: 10.8 G/DL (ref 12–16)
LYMPHOCYTES ABSOLUTE: 2.3 K/UL (ref 1–5.1)
LYMPHOCYTES RELATIVE PERCENT: 29 %
MACROCYTES: ABNORMAL
MCH RBC QN AUTO: 27.7 PG (ref 26–34)
MCHC RBC AUTO-ENTMCNC: 32.6 G/DL (ref 31–36)
MCV RBC AUTO: 85 FL (ref 80–100)
METAMYELOCYTES RELATIVE PERCENT: 1 %
MICROCYTES: ABNORMAL
MONOCYTES ABSOLUTE: 0.7 K/UL (ref 0–1.3)
MONOCYTES RELATIVE PERCENT: 9 %
NEUTROPHILS ABSOLUTE: 4.8 K/UL (ref 1.7–7.7)
NEUTROPHILS RELATIVE PERCENT: 47 %
PDW BLD-RTO: 16.6 % (ref 12.4–15.4)
PLATELET # BLD: 397 K/UL (ref 135–450)
PMV BLD AUTO: 7.3 FL (ref 5–10.5)
POLYCHROMASIA: ABNORMAL
POTASSIUM REFLEX MAGNESIUM: 4 MMOL/L (ref 3.5–5.1)
RBC # BLD: 3.92 M/UL (ref 4–5.2)
SODIUM BLD-SCNC: 142 MMOL/L (ref 136–145)
TOTAL PROTEIN: 6.7 G/DL (ref 6.4–8.2)
WBC # BLD: 8 K/UL (ref 4–11)

## 2021-09-13 PROCEDURE — 6360000002 HC RX W HCPCS: Performed by: INTERNAL MEDICINE

## 2021-09-13 PROCEDURE — 2580000003 HC RX 258: Performed by: INTERNAL MEDICINE

## 2021-09-13 PROCEDURE — 85025 COMPLETE CBC W/AUTO DIFF WBC: CPT

## 2021-09-13 PROCEDURE — 99232 SBSQ HOSP IP/OBS MODERATE 35: CPT | Performed by: INTERNAL MEDICINE

## 2021-09-13 PROCEDURE — 36415 COLL VENOUS BLD VENIPUNCTURE: CPT

## 2021-09-13 PROCEDURE — 6360000002 HC RX W HCPCS: Performed by: OBSTETRICS & GYNECOLOGY

## 2021-09-13 PROCEDURE — 80053 COMPREHEN METABOLIC PANEL: CPT

## 2021-09-13 RX ORDER — DOXYCYCLINE HYCLATE 100 MG
100 TABLET ORAL 2 TIMES DAILY
Qty: 10 TABLET | Refills: 0 | Status: SHIPPED | OUTPATIENT
Start: 2021-09-13 | End: 2021-09-22 | Stop reason: ALTCHOICE

## 2021-09-13 RX ORDER — OXYCODONE HYDROCHLORIDE AND ACETAMINOPHEN 5; 325 MG/1; MG/1
1 TABLET ORAL EVERY 6 HOURS PRN
Qty: 20 TABLET | Refills: 0 | Status: SHIPPED | OUTPATIENT
Start: 2021-09-13 | End: 2021-09-22

## 2021-09-13 RX ADMIN — ENOXAPARIN SODIUM 40 MG: 40 INJECTION SUBCUTANEOUS at 08:34

## 2021-09-13 RX ADMIN — VANCOMYCIN HYDROCHLORIDE 1250 MG: 10 INJECTION, POWDER, LYOPHILIZED, FOR SOLUTION INTRAVENOUS at 12:29

## 2021-09-13 RX ADMIN — MEROPENEM 1000 MG: 1 INJECTION, POWDER, FOR SOLUTION INTRAVENOUS at 03:00

## 2021-09-13 RX ADMIN — MEROPENEM 1000 MG: 1 INJECTION, POWDER, FOR SOLUTION INTRAVENOUS at 10:43

## 2021-09-13 NOTE — CARE COORDINATION
D/c order noted. Spoke with Yoselin SPICER. Stated she received nursing communication on need for [address] no order placed. Spoke with BODØ at [facility] stated it may be covered as I described. REYES spoke with patient and  about [address] services. In mean time yoselin spoke with Dr Mcmanus Sessions reported plan for follow up in office in a four days and wound clinic next week. Yoselin who also is our wound nurse will educate patient and  on wound care and signs to watch for. No further DCP needs.  Anabell Delgado RN

## 2021-09-13 NOTE — CARE COORDINATION
CASE MANAGEMENT INITIAL ASSESSMENT      Reviewed chart and completed assessment with:patient  Explained Case Management role/services. Primary contact information:[facility] Decision Maker :   Primary Decision Maker: Fransisco Boudreaux - [phone]          Can this person be reached and be able to respond quickly, such as within a few minutes or hours? Yes      Admit date/status:9/11/21  Diagnosis:wound infection   Is this a Readmission?:  Yes  Home with family    Adline Fallen required for SNF: Yes       3 night stay required: No    Living arrangements, Adls, care needs, prior to admission:lives in e with hsuband and 2 kids    Transportation:private     Durable Medical Equipment at home:  Walker__Cane__RTS__ BSC__Shower Chair__  02__ HHN__ CPAP__  BiPap__  Hospital Bed__ W/C___ Other__________    Services in the home and/or outpatient, prior to [address] (if applicable)   · Name:  · Address:  · Dialysis Schedule:  · Phone:  · Fax:    PT/OT recs:none    Hospital Exemption Notification (HEN):needed for SNF    Barriers to discharge:none    Plan/comments:spoke with patient. Plans on returning home at discharge. Reported IPTA. Wants to get home to new born. Children arre in care of spouse. Will follow for IVATBX and HHC needs.  Robbin Laguerre RN       [facility] on chart for MD ochoa

## 2021-09-13 NOTE — PROGRESS NOTES
with cellulitis.  No drainable   fluid collection.        Assessment:     Patient Active Problem List    Diagnosis Date Noted    Elevated sed rate     Bandemia     Postpartum fever     Elevated C-reactive protein (CRP)     Non-smoker     Class 1 obesity due to excess calories with body mass index (BMI) of 31.0 to 31.9 in adult     Wound infection after surgery 2021    Fetal intolerance to labor, delivered, current hospitalization 2021    Normal labor     H/O  section        S/p PLLTCS 21  DC on POD #3     Readmitted 21 with fever and SSI that failed to respond to po cephalexin started 21  Wound culture with MRSA    CT without deep abscess  Clinically improved since admission     Allergy pcn, sulfa     Plan:   Could be discharged from my standpoint, po doxy x5d more, Rx sent to her pharmacy  Discussed precautions associated with MRSA with family and infant   Wound care per OB     Discussed with patient/family, all questions answered  D/w RN       Rudy Sánchez MD  Phone: [phone]   Fax : [phone]

## 2021-09-13 NOTE — PROGRESS NOTES
Pt is a/o x4. VSS. Assessment as charted.      - Pts abd wound dressing is C/D/I- pt still has good appetite and denies any significant abd pain that would require pain medication. Pt denies dysuria.       Pt is currently resting in her bed that is locked and in its lowesr position w/ her call light with in reach, non-skid socks on, and bed alarm off d/t pt being independent. Pt denies any other needs at this time.

## 2021-09-13 NOTE — DISCHARGE SUMMARY
showing no focal abnormalities. Normal appendix. Evaluation of the colon shows no acute process. Pelvis: Enlarged uterus and prominent endometrium consistent with recent   postpartum.  scar noted in the anterior lower uterine segment. No   intrapelvic fluid collections. Urinary bladder is unremarkable. Peritoneum/Retroperitoneum: No free intraperitoneal fluid or significant   lymphadenopathy. Bones/Soft Tissues: Within the subcutaneous fat in the suprapubic region   there is extensive fat stranding extending from groin to groin with some   underlying gas pockets compatible with cellulitis. There is suggestion of   wound defect someone packing but this should be correlated clinically. No   drainable fluid collection. No acute bony abnormality. Impression   1. Uterus demonstrates postsurgical/post vacuum changes as noted above. No   abnormal intrapelvic fluid collection. 2. Suprapubic subcutaneous fat stranding and some gas pockets most probably   related to incision for . Compatible with cellulitis. No drainable   fluid collection.          2021  7:48 AM - Guillaume Puckett Incoming Lab Results From Soft (Epic Adt)    Component Value Ref Range & Units Status Collected Lab   Sodium 142  136 - 145 mmol/L Final 2021  7:08 AM [address] Lab   Potassium reflex Magnesium 4.0  3.5 - 5.1 mmol/L Final 2021  7:08 AM [facility] Lab   Chloride 107  99 - 110 mmol/L Final 2021  7:08 AM [facility]   CO2 26  21 - 32 mmol/L Final 2021  7:08 AM [facility] Lab   Anion Gap 9  3 - 16 Final 2021  7:08 AM [facility] Lab   Glucose 95  70 - 99 mg/dL Final 2021  7:08 AM [address] Lab   BUN 10  7 - 20 mg/dL Final 2021  7:08 AM [facility] Lab   CREATININE 0.7  0.6 - 1.1 mg/dL Final 2021  7:08 AM [facility] Lab   GFR Non-African American >60  >60 Final 09/13/2021  7:08 AM [facility] Lab   >60 mL/min/1.73m2 EGFR, calc. for ages 25 and older using the   MDRD formula (not corrected for weight), is valid for stable   renal function. GFR  >60  >60 Final 09/13/2021  7:08 AM [facility] Lab   Chronic Kidney Disease: less than 60 ml/min/1.73 sq.m. Kidney Failure: less than 15 ml/min/1.73 sq.m. Results valid for patients 18 years and older. Calcium 9.0  8.3 - 10.6 mg/dL Final 09/13/2021  7:08 AM [facility] Lab   Total Protein 6.7  6.4 - 8.2 g/dL Final 09/13/2021  7:08 AM [facility] Lab   Albumin 3.4  3.4 - 5.0 g/dL Final 09/13/2021  7:08 AM [facility] Lab   Albumin/Globulin Ratio 1.0Low   1.1 - 2.2 Final 09/13/2021  7:08 AM [facility] Lab   Total Bilirubin <0.2  0.0 - 1.0 mg/dL Final 09/13/2021  7:08 AM [facility] Lab   Alkaline Phosphatase 108  40 - 129 U/L Final 09/13/2021  7:08 AM [address] Lab   ALT 12  10 - 40 U/L Final 09/13/2021  7:08 AM [facility] Lab   AST 13Low   15 - 37 U/L Final 09/13/2021  7:08 AM [facility] Lab   Globulin 3.3  g/dL Final 09/13/2021  7:08 AM [facility]   Testing Performed By    Shauna Ratliff Name Director Address Valid Date Range   [address] LAB [place] [address] 08/30/17 0807-Present     9/13/2021  1:25 PM - Javier Lorenz Incoming Lab Results From Soft (Epic Adt)    Component Value Ref Range & Units Status Collected Lab   WBC 8.0  4.0 - 11.0 K/uL Final 09/13/2021  7:08 AM [facility] Lab   RBC 3. 92Low   4.00 - 5.20 M/uL Final 09/13/2021  7:08 AM [facility] Lab   Hemoglobin 10.8Low   12.0 - 16.0 g/dL Final 09/13/2021  7:08 AM [facility] Lab   Hematocrit 33.3Low   36.0 - 48.0 % Final 09/13/2021  7:08 AM [facility] Lab   MCV 85.0  80.0 - 100.0 fL Final 09/13/2021  7:08 AM [facility]   MCH 27.7  26.0 - 34.0 pg Final 09/13/2021  7:08 AM [facility]   MCHC 32.6  31.0 - 36.0 g/dL Final 09/13/2021  7:08 AM [facility]   RDW 16.6High   12.4 - 15.4 % Final 09/13/2021  7:08 AM [facility]   Platelets 633  594 - 450 K/uL Final 09/13/2021  7:08 AM [address] Lab   MPV 7.3  5.0 - 10.5 fL Final 09/13/2021  7:08 AM [facility]   Neutrophils % 47.0  % Final 09/13/2021 7:08 AM [facility]   Lymphocytes % 29.0  % Final 09/13/2021 7:08 AM [facility] Lab   Monocytes % 9.0  % Final 09/13/2021 7:08 AM [facility]   Eosinophils % 2.0  % Final 09/13/2021 7:08 AM [facility]   Basophils % 0.0  % Final 09/13/2021 7:08 AM [facility]   Neutrophils Absolute 4.8  1.7 - 7.7 K/uL Final 09/13/2021  7:08 AM [facility] Lab   Lymphocytes Absolute 2.3  1.0 - 5.1 K/uL Final 09/13/2021  7:08 AM [facility]   Monocytes Absolute 0.7  0.0 - 1.3 K/uL Final 09/13/2021  7:08 AM [facility] Lab   Eosinophils Absolute 0.2  0.0 - 0.6 K/uL Final 09/13/2021  7:08 AM [facility]   Basophils Absolute 0.0  0.0 - 0.2 K/uL Final 09/13/2021  7:08 AM [facility]   Bands Relative 12High   0 - 7 % Final 09/13/2021  7:08 AM [address] [facility]   Metamyelocytes Relative 1Abnormal   % Final 09/13/2021  7:08 AM [address]    Final 09/13/2021  7:08 AM [address] [facility]   Macrocytes OccasionalAbnormal    Final 09/13/2021  7:08  [facility]    Final 09/13/2021  7:08 AM [facility]    Final 09/13/2021  7:08 AM [address] Lab   Testing Performed By        Treatments: IV hydration, antibiotics: vancomycin  Wet to Dry dressing changes    Discharge Exam:  POD#17   Abx D: 3   S: Patient reports having intermittent, lessening pain controlled w/ oral meds. Denies incisional bleeding or purulent discharge. Denies F/C/NS, HA, SOB, palpitations, N/V/D/C or urinary complaints. Reports less redness at incision site. Pt feeling much better & desires to go home. O:  Vitals:    09/12/21 1037 09/12/21 2232 09/12/21 2236 09/13/21 0831   BP: 118/76 127/80 127/80 118/79   Pulse: 89 88  69   Resp: 16 16  16   Temp: 98.2 °F (36.8 °C) 98.1 °F (36.7 °C)  98 °F (36.7 °C)   TempSrc: Oral Oral  Oral   SpO2: 97% 98%  98%     NAD  Heart: RRR  KRYSTLE:CTA b/l  Abd: Soft, +BS, soft, mild tenderness, minimal erythema. Dressing/packing removed minimal amount of serosanguinous fluid, no purulent d/c or odor. Fascia intact. Wound irrigated with saline - 3 cm midline opening and 1 cm right angle opening. Packing placed in both openings w/o difficulty. Labs: as above  Wound culture +MRSA  Blood cx's x 2 - negative  A/P:  POD 17 w/ post op wound infection & superficial wound separation  Plan d/c to home w/ plan for close f/u - nurse to set up [place] [id number] for dressing changes once a day, pt to f/u w/ [facility] wound [facility], instructed pt on how to perform packing changes. Pt to be sent home w/ supplies. Appreciate ID consult - pt d/c'd home on doxycycline. Pt instructed to f/u in office in 4 days or sooner prn. Questions answered. Pt to be discharged to home.              Disposition: home    In process/preliminary results:  Outstanding Order Results     Date and Time Order Name Status Description    9/11/2021  8:21 PM Culture, Blood 1 Preliminary     9/11/2021  8:21 PM Culture, Blood 2 Preliminary     9/11/2021  2:20 PM Culture, Wound Preliminary           Patient Instructions:   Current Discharge Medication List      START taking these medications    Details   doxycycline hyclate (VIBRA-TABS) 100 MG tablet Take 1 tablet by mouth 2 times daily for 5 days  Qty: 10 tablet, Refills: 0 oxyCODONE-acetaminophen (PERCOCET) 5-325 MG per tablet Take 1 tablet by mouth every 6 hours as needed for Pain for up to 3 days. Qty: 20 tablet, Refills: 0    Comments: Reduce doses taken as pain becomes manageable  Associated Diagnoses: Wound infection after surgery         CONTINUE these medications which have NOT CHANGED    Details   ibuprofen (ADVIL;MOTRIN) 800 MG tablet Take 1 tablet by mouth every 6 hours as needed for Pain  Qty: 60 tablet, Refills: 0      cetirizine (ZYRTEC) 10 MG tablet Take 10 mg by mouth daily      Prenatal Vit-Fe Fumarate-FA (PRENATAL 19 PO) Take by mouth           Activity: no lifting, Driving, or Strenuous exercise for 4 weeks and no sex for 8 weeks  Diet: regular diet  Wound Care: keep wound clean and dry and as directed    Follow-up with 4 days at [facility] if not seen by a [facility] nurse.    Signed:  Ric Suárez DO  9/13/2021  3:10 PM

## 2021-09-13 NOTE — PROGRESS NOTES
Per md order, pt d/c to home, reviewed med rec and d/c instructions, pt and spouse comfortable performing daily dressing changes, supplies given, questions answered, verbalized understanding, pt transported by wheelchair to private car

## 2021-09-14 LAB
GRAM STAIN RESULT: ABNORMAL
ORGANISM: ABNORMAL
WOUND/ABSCESS: ABNORMAL

## 2021-09-15 LAB
BLOOD CULTURE, ROUTINE: NORMAL
CULTURE, BLOOD 2: NORMAL

## 2021-09-22 ENCOUNTER — HOSPITAL ENCOUNTER (OUTPATIENT)
Dept: WOUND CARE | Age: 26
Discharge: HOME | End: 2021-09-22
Payer: COMMERCIAL

## 2021-09-22 VITALS
WEIGHT: 165 LBS | SYSTOLIC BLOOD PRESSURE: 122 MMHG | RESPIRATION RATE: 16 BRPM | DIASTOLIC BLOOD PRESSURE: 79 MMHG | HEART RATE: 86 BPM | HEIGHT: 64 IN | TEMPERATURE: 97.3 F | BODY MASS INDEX: 28.17 KG/M2

## 2021-09-22 DIAGNOSIS — T81.89XA RETAINED SUTURE, INITIAL ENCOUNTER: ICD-10-CM

## 2021-09-22 DIAGNOSIS — T81.31XA WOUND DEHISCENCE, SURGICAL, INITIAL ENCOUNTER: Primary | ICD-10-CM

## 2021-09-22 DIAGNOSIS — Z18.9 RETAINED SUTURE, INITIAL ENCOUNTER: ICD-10-CM

## 2021-09-22 PROCEDURE — 99213 OFFICE O/P EST LOW 20 MIN: CPT

## 2021-09-22 PROCEDURE — 99243 OFF/OP CNSLTJ NEW/EST LOW 30: CPT | Performed by: INTERNAL MEDICINE

## 2021-09-22 PROCEDURE — 12021 TX SUPFC WND DEHSN W/PACKING: CPT | Performed by: INTERNAL MEDICINE

## 2021-09-22 RX ORDER — M-VIT,TX,IRON,MINS/CALC/FOLIC 27MG-0.4MG
1 TABLET ORAL DAILY
COMMUNITY

## 2021-09-22 RX ORDER — BACITRACIN ZINC AND POLYMYXIN B SULFATE 500; 1000 [USP'U]/G; [USP'U]/G
OINTMENT TOPICAL ONCE
Status: CANCELLED | OUTPATIENT
Start: 2021-09-22 | End: 2021-09-22

## 2021-09-22 RX ORDER — LIDOCAINE HYDROCHLORIDE 40 MG/ML
SOLUTION TOPICAL ONCE
Status: DISCONTINUED | OUTPATIENT
Start: 2021-09-22 | End: 2021-09-23 | Stop reason: HOSPADM

## 2021-09-22 RX ORDER — BACITRACIN ZINC AND POLYMYXIN B SULFATE 500; 1000 [USP'U]/G; [USP'U]/G
OINTMENT TOPICAL ONCE
Status: DISCONTINUED | OUTPATIENT
Start: 2021-09-22 | End: 2021-09-23 | Stop reason: HOSPADM

## 2021-09-22 RX ORDER — LIDOCAINE HYDROCHLORIDE 40 MG/ML
SOLUTION TOPICAL ONCE
Status: CANCELLED | OUTPATIENT
Start: 2021-09-22 | End: 2021-09-22

## 2021-09-22 RX ORDER — LIDOCAINE 50 MG/G
OINTMENT TOPICAL ONCE
Status: DISCONTINUED | OUTPATIENT
Start: 2021-09-22 | End: 2021-09-23 | Stop reason: HOSPADM

## 2021-09-22 RX ORDER — LIDOCAINE 40 MG/G
CREAM TOPICAL ONCE
Status: CANCELLED | OUTPATIENT
Start: 2021-09-22 | End: 2021-09-22

## 2021-09-22 RX ORDER — LIDOCAINE 40 MG/G
CREAM TOPICAL ONCE
Status: DISCONTINUED | OUTPATIENT
Start: 2021-09-22 | End: 2021-09-23 | Stop reason: HOSPADM

## 2021-09-22 RX ORDER — LIDOCAINE 50 MG/G
OINTMENT TOPICAL ONCE
Status: CANCELLED | OUTPATIENT
Start: 2021-09-22 | End: 2021-09-22

## 2021-09-22 RX ORDER — LIDOCAINE 40 MG/G
CREAM TOPICAL
Qty: 30 G | Refills: 1 | Status: SHIPPED | OUTPATIENT
Start: 2021-09-22 | End: 2021-10-27 | Stop reason: ALTCHOICE

## 2021-09-22 ASSESSMENT — PAIN SCALES - GENERAL: PAINLEVEL_OUTOF10: 0

## 2021-09-22 NOTE — PLAN OF CARE
[address] [facility],[address] Floor:      [address] f: [phone] f: [phone] p: [phone] [email or web address]     Ordering Center:     [address]  [address] [facility]  [phone]  Dept: [phone]   Fax# [phone]    Patient Information:      Lachelle Hood [facility]  [place] [facility] [address]   [phone]   : 1995  AGE: 27 y.o. GENDER: female   TODAYS DATE:  2021    Insurance:      PRIMARY INSURANCE:  Plan: HUMANA POS OPEN ACCESS   Coverage: HUMANA  Effective Date: 2015  Group Number: [unfilled]  Subscriber Number: [id number] - (Commercial)    Payor/Plan Subscr  Sex Relation Sub. Ins. ID Effective Group Num   1. [place] 1995 Male Spouse [id number] 1/1/15 [id number]                                    [id number]         Patient Wound Information:     Additional ICD-10 Codes: T81.49XA, T81.31XA    Patient Active Problem List   Diagnosis Code    Normal labor O80, Z37.9    H/O  section Z98.891    Fetal intolerance to labor, delivered, current hospitalization O77.9    Wound infection after surgery T81.49XA    Elevated sed rate R70.0    Bandemia D72.825    Postpartum fever O86.4    Elevated C-reactive protein (CRP) R79.82    Non-smoker Z78.9    Class 1 obesity due to excess calories with body mass index (BMI) of 31.0 to 31.9 in adult E66.09, Z68.31    Wound dehiscence, surgical, initial encounter T81.31XA       WOUNDS REQUIRING DRESSING SUPPLIES:     Wound 21 #1, Mid abdomen, non healing surgical incision, full thickness, onset 21 (Active)   Wound Image   21 1021   Wound Etiology Non-Healing Surgical 21 1121   Dressing Status New dressing applied;Clean;Dry; Intact 21 1121   Wound Cleansed Irrigated with saline 21 1021   Dressing/Treatment Other (comment) 21 1121   Wound Length (cm) 0.1 cm 21 1021   Wound 3\"  [] Medipore Tape 3\"  [] Saline  [] Skin Prep   [] Adhesive Remover   [x] Cotton Tip Applicators  [] Tubular Stocking   [] Size E  [] Size G  [x] Other: HyTape    Patient currently being seen by Home Health: [] Yes   [x] No    Duration for needed supplies:  []15  [x]30  []60  []90 Days    Provider Information:      PROVIDER'S NAME/NPI  Dr. Dulce Hunt  NPI [id number]  I give permission to coordinate the care for this patient

## 2021-09-22 NOTE — PLAN OF CARE
Patient new to [address] [place] [address],[address] today for non-healing surgical wound. Consent reviewed with Pt per Dr. Flower Celestin. Pt verbalized understanding and signed consent. Follow up in [address] [place] [address] in 1 week As ordered. Pt. Aware to call sooner with any problems or questions/concerns.   MD orders/D/C instructions reviewed with patient, all questions answered; copy of instructions given to patient

## 2021-09-26 PROBLEM — T81.89XA RETAINED SUTURE: Status: ACTIVE | Noted: 2021-09-26

## 2021-09-26 PROBLEM — Z18.9 RETAINED SUTURE: Status: ACTIVE | Noted: 2021-09-26

## 2021-09-26 NOTE — CONSULTS
[facility] Consult Note    Pooja Morales     : 1995    DATE OF VISIT:  2021    Subjective:     Pooja Morales is a 27 y.o. female who has a dehisced surgical ulcer located on the low anterior abdomen. Consult requested by Dr. Nasim Saleh for two areas of surgical wound dehiscence, with a recent surgical site infection. Current complaint of pain in this ulcer? yes. Quality of pain: aching and sharp  Timing: intermittent and decreasing in frequency  Severity: mild-moderate  Associated Signs/Symptoms:  drainage (light, bloody)  Other significant symptoms or pertinent ulcer history: Mrs. Petros Murray underwent a  a few weeks ago for the birth of her first child. Things went well in the immediate post-partum period. A week or so later she had some symptoms of mastitis, and was prescribed a course of Keflex, I believe. Shortly afterwards an area of focal swelling to the right side of her  wound started to appear, and then became red and more painful. She also had a bit of a fever, so went back to the hospital. When SteriStrips were removed, two small areas of the surgical site opened up and drained some purulent fluid. CT scan was performed, routine blood work, cultures, etc. She was on IV Abx for a short time, discharged home with oral doxycycline to complete therapy. Infection seems to have resolved, but there are still two small areas of dehiscence, the midline one doing well, but the one on the right side ha gotten tighter at the skin surface, and has become harder to pack, with a bit of increasing pain from that. No current F/C/D, resolved redness, no pus or malodor now, no side effects from Abx (sore throat or mouth, rash, pruritus, N/V/D). Additional ulcer(s) noted? no.      Ms. Petros Murray has a past medical history of MRSA (methicillin resistant staph aureus) culture positive.     She has a past surgical history that includes LASIK (Bilateral, 2016); [place] tooth extraction (); and  section (N/A, 2021). Her family history includes Arrhythmia in her father; Cancer (age of onset: 54) in her father; Diabetes in her father; Heart Disease in her father and maternal grandfather; No Known Problems in her mother, sister, and sister; Pacemaker in her father; Prostate Cancer in her father. Ms. Angel Hanson reports that she has never smoked. She has never used smokeless tobacco. She reports previous alcohol use. She reports that she does not use drugs. Her current medication list consists of cetirizine, ibuprofen, and therapeutic multivitamin-minerals. Recent doxycycline, and cephalexin just before admission, when she had a couple of days of IV therapy. Allergies: Pseudoephedrine, Septra [sulfamethoxazole-trimethoprim], Trimethoprim, and Pcn [penicillins]    Pertinent items from the review of systems are discussed in the HPI; the remainder of the ROS was reviewed and is negative. Objective:     Vitals:    21 1010   BP: 122/79   Pulse: 86   Resp: 16   Temp: 97.3 °F (36.3 °C)   TempSrc: Oral   Weight: 165 lb (74.8 kg)   Height: 5' 4\" (1.626 m)       Constitutional:  well-developed, well-nourished, NAD  Psychiatric:  oriented to person, place and time; mood and affect appropriate for the situation   Eyes:  pupils equal, round and reactive to light; sclerae anicteric, conjunctivae not pale  ENT: no thrush or oral ulcers, mucous membranes moist  Abd: soft, NT, ND, good BS - no residual cellulitis, no areas of fluctuance now  Kendra-ulcer skin: indurated, pink, warm and dry.   Ulcer(s): midline dehiscence is small, red, granular, a small amount of depth, a bit of serosanguinous drainage, pretty easy to pack gently into that little bit of depth; the dehiscence on the right side is narrower at the surface, but deeper, tunneling a bit in the medial and superior direction, no pus - that area almost impossible to pack with 1/4\" gauze, as it is now. Photos also saved in electronic chart. Today's Wound Measurements, per RN documentation:  Wound 21 #1, Mid abdomen, non healing surgical incision, full thickness, onset 21-Wound Length (cm): 0.1 cm  Wound 21 #2, right lower abdomen, non healing surgical incision, full thickness, onset 21-Wound Length (cm): 0.2 cm    Wound 21 #1, Mid abdomen, non healing surgical incision, full thickness, onset 21-Wound Width (cm): 0.8 cm  Wound 21 #2, right lower abdomen, non healing surgical incision, full thickness, onset 21-Wound Width (cm): 0.3 cm    Wound 21 #1, Mid abdomen, non healing surgical incision, full thickness, onset 21-Wound Depth (cm): 1.8 cm  Wound 21 #2, right lower abdomen, non healing surgical incision, full thickness, onset 21-Wound Depth (cm): 1.7 cm  _____________________________    Lab Results   Component Value Date    LABALBU 3.4 2021     Lab Results   Component Value Date    CREATININE 0.7 2021     Lab Results   Component Value Date    HGB 10.8 (L) 2021     Assessment:     Patient Active Problem List   Diagnosis Code    Normal labor O80, Z37.9    H/O  section Z98.891    Fetal intolerance to labor, delivered, current hospitalization O77.9    Wound infection after surgery T81.49XA    Wound dehiscence, surgical, initial encounter T81.31XA    Retained suture T81.89XA, Z18.9       Assessment of today's active condition(s): recent , no immediate periop complications, but signs of an MRSA wound infection a week or two post-op, small areas of wound dehiscence, infection seems resolved, midline dehiscence should do ok, but I think we need to open up the lateral site a bit more, to allow for a bit of exploration there, make sure there's no retained fat necrosis, abscess, etc, and allow for better packing of that site.  Factors contributing to occurrence and/or persistence of the chronic ulcer include none. Medical necessity of today's visit is shown by the above documentation. Sharp debridement, per se, is not necessasrily indicated today, based upon the exam findings in the ulcer(s) above, but I think we do need to at least make an incision at the lateral dehiscence to open that up and explore that area of wound a bit better. Procedure note:     Consent obtained. Time out performed per [facility]. Anesthetic  Anesthetic: 2% Lidocaine Injectable -- 2mL injected into the SQ space, with a 25g needle. Using a #15 blade, I made an approximately 1cm incision to the medial aspect of that right sided focus of dehiscence, down through the skin and SQ layer. No pus obtained, no true necrotic tissue visualized, but there was a small focus of rough-feeling tissue and almost a sense of obstruction to packing that area - explored that a bit more deeply (2 cm), and there was a suture that was exposed within that segment of wound, probably chronically colonized with MRSA at this point now, given the recent infection, so I removed that stitch with the blade and forceps. Did not actually identify any necrotic tissue that needed to be excised. The ulcers were then irrigated with normal saline solution. The procedure was completed with a small amount of bleeding, and hemostasis was with pressure. The patient tolerated the procedure well, with no significant complications. The patient's level of pain during and after the procedure was monitored. Post-debridement measurements, if different from pre-debridement, are in the flowsheet as well.      Discharge plan:     Treatment in the wound care center today, per RN documentation: Wound 09/22/21 #1, Mid abdomen, non healing surgical incision, full thickness, onset 8/27/21-Dressing/Treatment: Other (comment) (1/4\" iodoform packing, 4x4, ABD, hytape )  Wound 09/22/21 #2, right lower abdomen, non healing surgical incision, full thickness, onset 8/27/21-Dressing/Treatment: Other (comment) (1/4\" iodoform packing, 4x4, ABD, hytape ). No additional ABx needed right now. Gave her some iodoform today. Called in a script for some topical lidocaine with dressing changes, and she can get some additional lidocaine cream OTC if needed. Will order some secondary gauze dressings from a local [employer] company. Home treatment: good handwashing before and after any dressing changes. Cleanse ulcer with saline or soap & water before dressing change. May use Vaseline (petrolatum), Aquaphor, Aveeno, CeraVe, Cetaphil, Eucerin, Lubriderm, etc for dry skin. Dressing type for home: pretreat with topical lidocaine, then short pieces of 1/4\" iodoform, gauze and/or ABDs, tape, once daily. Written discharge instructions given to patient. Follow up in 9 days -- that way we can assess and probably pack her wound again before that next weekend, when her  will be out of town for a few days.      Electronically signed by Moshe Woodall MD on 9/26/2021 at 4:17 PM.

## 2021-10-01 ENCOUNTER — HOSPITAL ENCOUNTER (OUTPATIENT)
Dept: WOUND CARE | Age: 26
Discharge: HOME | End: 2021-10-01
Payer: COMMERCIAL

## 2021-10-01 VITALS
SYSTOLIC BLOOD PRESSURE: 131 MMHG | WEIGHT: 161 LBS | HEIGHT: 64 IN | RESPIRATION RATE: 16 BRPM | HEART RATE: 99 BPM | TEMPERATURE: 97.3 F | BODY MASS INDEX: 27.49 KG/M2 | DIASTOLIC BLOOD PRESSURE: 80 MMHG

## 2021-10-01 DIAGNOSIS — T81.31XA WOUND DEHISCENCE, SURGICAL, INITIAL ENCOUNTER: Primary | ICD-10-CM

## 2021-10-01 PROCEDURE — 99212 OFFICE O/P EST SF 10 MIN: CPT | Performed by: INTERNAL MEDICINE

## 2021-10-01 PROCEDURE — 99212 OFFICE O/P EST SF 10 MIN: CPT

## 2021-10-01 RX ORDER — LIDOCAINE HYDROCHLORIDE 40 MG/ML
SOLUTION TOPICAL ONCE
Status: CANCELLED | OUTPATIENT
Start: 2021-10-01 | End: 2021-10-01

## 2021-10-01 RX ORDER — LIDOCAINE 40 MG/G
CREAM TOPICAL ONCE
Status: CANCELLED | OUTPATIENT
Start: 2021-10-01 | End: 2021-10-01

## 2021-10-01 RX ORDER — LIDOCAINE 50 MG/G
OINTMENT TOPICAL ONCE
Status: CANCELLED | OUTPATIENT
Start: 2021-10-01 | End: 2021-10-01

## 2021-10-01 RX ORDER — BACITRACIN ZINC AND POLYMYXIN B SULFATE 500; 1000 [USP'U]/G; [USP'U]/G
OINTMENT TOPICAL ONCE
Status: CANCELLED | OUTPATIENT
Start: 2021-10-01 | End: 2021-10-01

## 2021-10-01 RX ORDER — LIDOCAINE 40 MG/G
CREAM TOPICAL ONCE
Status: DISCONTINUED | OUTPATIENT
Start: 2021-10-01 | End: 2021-10-02 | Stop reason: HOSPADM

## 2021-10-01 ASSESSMENT — PAIN SCALES - GENERAL: PAINLEVEL_OUTOF10: 0

## 2021-10-01 NOTE — PLAN OF CARE
Patient will continue to use same dressing reime but the dressing applied today per MD will stay in place until Sunday when her  is back and can do daily dressings. Patient will change secondary dressings PRN daily. Discharge instructions reviewed with patient, all questions answered, copy given to patient. Dressings were applied to all wounds per M.D. Instructions at this visit.

## 2021-10-04 NOTE — PROGRESS NOTES
88 [facility] Progress Note    Manish Hartman     : 1995    DATE OF VISIT:  10/1/2021    Subjective:     Manish Hartman is a 27 y.o. female who has a dehisced surgical ulcer located on the abdomen (inferior, midline and to the right). Current complaint of pain in this ulcer? yes. Quality of pain: aching and sharp  Timing: intermittent and stable  Severity: mild-moderate  Associated Signs/Symptoms: drainage (light, serosanguinous)  Other significant symptoms or pertinent ulcer history: doing ok with dressing changes this week. Some topical lidocaine has helped the pain. No fever, pruritus, redness, pus, malodor. Right side feels easier to pack now than last week, but the center is getting a bit harder to pack now. Additional ulcer(s) noted? no.      Ms. Keturah Fall has a past medical history of MRSA (methicillin resistant staph aureus) culture positive. She has a past surgical history that includes LASIK (Bilateral, ); [place] tooth extraction (); and  section (N/A, 2021). Her family history includes Arrhythmia in her father; Cancer (age of onset: 54) in her father; Diabetes in her father; Heart Disease in her father and maternal grandfather; No Known Problems in her mother, sister, and sister; Pacemaker in her father; Prostate Cancer in her father. Ms. Keturah Fall reports that she has never smoked. She has never used smokeless tobacco. She reports previous alcohol use. She reports that she does not use drugs. Her current medication list consists of cetirizine, ibuprofen, lidocaine, and therapeutic multivitamin-minerals. Allergies: Pseudoephedrine, Septra [sulfamethoxazole-trimethoprim], Trimethoprim, and Pcn [penicillins]    Pertinent items from the review of systems are discussed in the HPI; the remainder of the ROS was reviewed and is negative.      Objective:     Vitals:    10/01/21 1017   BP: 131/80   Pulse: 99   Resp: 16   Temp: 97.3 °F (36.3 °C)   TempSrc: Oral   Weight: 161 lb (73 kg)   Height: 5' 4\" (1.626 m)       Constitutional:  well-developed, well-nourished, NAD  Abd: soft, NT, ND, good BS - no residual cellulitis, no areas of fluctuance now  Kendra-ulcer skin: mildly indurated, pink, warm and dry. Ulcer(s): midline dehiscence does feel a bit more narrow at the skin surface now, but actually feels perhaps a bit deeper than last week, perhaps with a little ridge of scar tissue there (or another suture?). Dehiscence on the right side seems to be doing better, red, granular, less depth, fairly easy to probe, tissue feels uniform there, no signs of infection. Photos also saved in electronic chart.     Today's Wound Measurements, per RN documentation:  Wound 21 #1, Mid abdomen, non healing surgical incision, full thickness, onset 21-Wound Length (cm): 0.2 cm  Wound 21 #2, right lower abdomen, non healing surgical incision, full thickness, onset 21-Wound Length (cm): 0.1 cm    Wound 21 #1, Mid abdomen, non healing surgical incision, full thickness, onset 21-Wound Width (cm): 0.6 cm  Wound 21 #2, right lower abdomen, non healing surgical incision, full thickness, onset 21-Wound Width (cm): 0.4 cm    Wound 21 #1, Mid abdomen, non healing surgical incision, full thickness, onset 21-Wound Depth (cm): 1.3 cm  Wound 21 #2, right lower abdomen, non healing surgical incision, full thickness, onset 21-Wound Depth (cm): 1.7 cm  ______________________________    Lab Results   Component Value Date    LABALBU 3.4 2021     Lab Results   Component Value Date    CREATININE 0.7 2021     Lab Results   Component Value Date    HGB 10.8 (L) 2021     No results found for: LABA1C  Assessment:     Patient Active Problem List   Diagnosis Code    Normal labor O80, Z37.9    H/O  section Z98.891    Fetal intolerance to labor, delivered, current hospitalization O77.9    Wound infection after surgery T81.49XA    Wound dehiscence, surgical, initial encounter T81.31XA    Retained suture T81.89XA, Z18.9       Assessment of today's active condition(s): recent , post-op MRSA infection and two small segments of wound dehiscence, I think the right side doing a bit better this week after opening it up slightly last week and removing an exposed suture; midline segment seems a bit more stalled, might actually also need to be opened up a bit and explored, but I'll give it another week first. Factors contributing to occurrence and/or persistence of the chronic ulcer include none. Medical necessity of today's visit is shown by the above documentation. Sharp debridement is not indicated today, based upon the exam findings in the wound(s) above. Discharge plan:     Treatment in the wound care center today, per RN documentation: Wound 21 #1, Mid abdomen, non healing surgical incision, full thickness, onset 21-Dressing/Treatment:  (MD applied steristrip, iodoform, )  Wound 21 #2, right lower abdomen, non healing surgical incision, full thickness, onset 21-Dressing/Treatment:  (MD applied steristrip, iodoform). Home treatment: good handwashing before and after any dressing changes. Cleanse ulcer with saline or soap & water before dressing change. May use Vaseline (petrolatum), Aquaphor, Aveeno, CeraVe, Cetaphil, Eucerin, Lubriderm, etc for dry skin. Dressing type for home: \" packing gauze (lidocaine first PRN), cover dressing, once daily. Written discharge instructions given to patient. Follow up in 1 week.     Electronically signed by Minh Diaz MD on 10/4/2021 at 7:16 PM.

## 2021-10-08 ENCOUNTER — HOSPITAL ENCOUNTER (OUTPATIENT)
Dept: WOUND CARE | Age: 26
Discharge: HOME | End: 2021-10-08
Payer: COMMERCIAL

## 2021-10-08 VITALS
BODY MASS INDEX: 27.35 KG/M2 | HEART RATE: 87 BPM | WEIGHT: 160.2 LBS | RESPIRATION RATE: 16 BRPM | TEMPERATURE: 96.7 F | SYSTOLIC BLOOD PRESSURE: 117 MMHG | DIASTOLIC BLOOD PRESSURE: 79 MMHG | HEIGHT: 64 IN

## 2021-10-08 DIAGNOSIS — T81.31XA WOUND DEHISCENCE, SURGICAL, INITIAL ENCOUNTER: Primary | ICD-10-CM

## 2021-10-08 PROCEDURE — 11042 DBRDMT SUBQ TIS 1ST 20SQCM/<: CPT | Performed by: INTERNAL MEDICINE

## 2021-10-08 PROCEDURE — 11042 DBRDMT SUBQ TIS 1ST 20SQCM/<: CPT

## 2021-10-08 PROCEDURE — 97607 NEG PRS WND THR NDME<=50SQCM: CPT

## 2021-10-08 RX ORDER — BACITRACIN ZINC AND POLYMYXIN B SULFATE 500; 1000 [USP'U]/G; [USP'U]/G
OINTMENT TOPICAL ONCE
Status: CANCELLED | OUTPATIENT
Start: 2021-10-08 | End: 2021-10-08

## 2021-10-08 RX ORDER — LIDOCAINE 40 MG/G
CREAM TOPICAL ONCE
Status: CANCELLED | OUTPATIENT
Start: 2021-10-08 | End: 2021-10-08

## 2021-10-08 RX ORDER — LIDOCAINE HYDROCHLORIDE 40 MG/ML
SOLUTION TOPICAL ONCE
Status: CANCELLED | OUTPATIENT
Start: 2021-10-08 | End: 2021-10-08

## 2021-10-08 RX ORDER — LIDOCAINE 40 MG/G
CREAM TOPICAL ONCE
Status: DISCONTINUED | OUTPATIENT
Start: 2021-10-08 | End: 2021-10-09 | Stop reason: HOSPADM

## 2021-10-08 RX ORDER — LIDOCAINE 50 MG/G
OINTMENT TOPICAL ONCE
Status: CANCELLED | OUTPATIENT
Start: 2021-10-08 | End: 2021-10-08

## 2021-10-08 RX ORDER — NORGESTIMATE AND ETHINYL ESTRADIOL 7DAYSX3 28
KIT ORAL
COMMUNITY
Start: 2021-09-30

## 2021-10-08 ASSESSMENT — PAIN SCALES - GENERAL: PAINLEVEL_OUTOF10: 0

## 2021-10-08 NOTE — PLAN OF CARE
Debridement of wounds today per MD.  Will begin using CONRADO NPWT dressing today. Discharge instructions reviewed with patient, all questions answered, copy given to patient. Dressings were applied to all wounds per M.D. Instructions at this visit.

## 2021-10-11 PROBLEM — T81.49XA WOUND INFECTION AFTER SURGERY: Status: RESOLVED | Noted: 2021-09-11 | Resolved: 2021-10-11

## 2021-10-11 NOTE — PROGRESS NOTES
88 [facility] Progress Note    Celestino Dudley     : 1995    DATE OF VISIT:  10/8/2021    Subjective:     Celestino Dudley is a 27 y.o. female who has a dehisced surgical ulcer located on the abdomen (low midline). Significant symptoms or pertinent wound history since last visit: feeling ok overall, still has a bit of pain during packing, but not much otherwise; mild pruritus from dressings, small amount of blood-tinged drainage. No F/C/D. Pain with packing is not quite so strong with topical lidocaine. Additional ulcer(s) noted? no.      Her current medication list consists of Norgestim-Eth Estrad Triphasic, cetirizine, ibuprofen, lidocaine, and therapeutic multivitamin-minerals. Allergies: Pseudoephedrine, Septra [sulfamethoxazole-trimethoprim], Trimethoprim, and Pcn [penicillins]    Objective:     Vitals:    10/08/21 1006   BP: 117/79   Pulse: 87   Resp: 16   Temp: 96.7 °F (35.9 °C)   TempSrc: Oral   Weight: 160 lb 3.2 oz (72.7 kg)   Height: 5' 4\" (1.626 m)     Constitutional:  well-developed, well-nourished, NAD  Abd: soft, NT, ND, good BS - no residual cellulitis, no areas of fluctuance now  Kendra-ulcer skin: mildly indurated, pink, warm and dry. Ulcer(s): midline dehiscence does feel a bit more narrow at the skin surface again, but still has a bit of depth, part granular, small area of fat necrosis, a bit of fibrin. Dehiscence on the right side seems to be only stable/stagnant this week again also, red, granular, stable depth, fairly easy to probe, tissue feels uniform there, no signs of infection. Photos also saved in electronic chart.     Today's wound measurements, per RN documentation:  Wound 21 #1, Mid abdomen, non healing surgical incision, full thickness, onset 21-Wound Length (cm): 0.2 cm  Wound 21 #2, right lower abdomen, non healing surgical incision, full thickness, onset 21-Wound Length (cm): 0.1 cm    Wound 21 #1, Mid abdomen,

## 2021-10-15 ENCOUNTER — HOSPITAL ENCOUNTER (OUTPATIENT)
Dept: WOUND CARE | Age: 26
Discharge: HOME | End: 2021-10-15
Payer: COMMERCIAL

## 2021-10-15 VITALS
SYSTOLIC BLOOD PRESSURE: 119 MMHG | BODY MASS INDEX: 27.21 KG/M2 | RESPIRATION RATE: 18 BRPM | DIASTOLIC BLOOD PRESSURE: 80 MMHG | HEIGHT: 64 IN | TEMPERATURE: 97.4 F | WEIGHT: 159.4 LBS | HEART RATE: 93 BPM

## 2021-10-15 DIAGNOSIS — T81.31XA WOUND DEHISCENCE, SURGICAL, INITIAL ENCOUNTER: Primary | ICD-10-CM

## 2021-10-15 PROCEDURE — 99213 OFFICE O/P EST LOW 20 MIN: CPT

## 2021-10-15 PROCEDURE — 99212 OFFICE O/P EST SF 10 MIN: CPT | Performed by: INTERNAL MEDICINE

## 2021-10-15 RX ORDER — LIDOCAINE 40 MG/G
CREAM TOPICAL ONCE
Status: DISCONTINUED | OUTPATIENT
Start: 2021-10-15 | End: 2021-10-16 | Stop reason: HOSPADM

## 2021-10-15 RX ORDER — LIDOCAINE HYDROCHLORIDE 40 MG/ML
SOLUTION TOPICAL ONCE
Status: CANCELLED | OUTPATIENT
Start: 2021-10-15 | End: 2021-10-15

## 2021-10-15 RX ORDER — LIDOCAINE 50 MG/G
OINTMENT TOPICAL ONCE
Status: CANCELLED | OUTPATIENT
Start: 2021-10-15 | End: 2021-10-15

## 2021-10-15 RX ORDER — BACITRACIN ZINC AND POLYMYXIN B SULFATE 500; 1000 [USP'U]/G; [USP'U]/G
OINTMENT TOPICAL ONCE
Status: CANCELLED | OUTPATIENT
Start: 2021-10-15 | End: 2021-10-15

## 2021-10-15 RX ORDER — LIDOCAINE 40 MG/G
CREAM TOPICAL ONCE
Status: CANCELLED | OUTPATIENT
Start: 2021-10-15 | End: 2021-10-15

## 2021-10-15 ASSESSMENT — PAIN SCALES - GENERAL: PAINLEVEL_OUTOF10: 0

## 2021-10-18 NOTE — PLAN OF CARE
Patient did well with CONRADO and marked improvement noted in depth of wounds. Will change to Collagen today for primary wound dressing and discontinue CONRDAO. Patient also instructed she can shower. Discharge instructions reviewed with patient, all questions answered, copy given to patient. Dressings were applied to all wounds per M.D. Instructions at this visit.

## 2021-10-19 NOTE — PROGRESS NOTES
88 [facility] Progress Note    Angela Thornton     : 1995    DATE OF VISIT:  10/15/2021    Subjective:     Angela Thornton is a 27 y.o. female who has a dehisced surgical ulcer located on the abdomen (low midline). Current complaint of pain in this ulcer? yes. Quality of pain: aching and sharp  Timing: intermittent and stable  Severity: mild  Associated Signs/Symptoms: drainage (light, serosanguinous) and minor dressing-related irritation and pruritus  Other significant symptoms or pertinent ulcer history: feeling well overall, no deep abdominal or pelvic pain, no fever or chills, appetite good. Additional ulcer(s) noted? no.      Ms. Юлия Gould has a past medical history of MRSA (methicillin resistant staph aureus) culture positive and Wound infection after surgery. She has a past surgical history that includes LASIK (Bilateral, ); [place] tooth extraction (); and  section (N/A, 2021). Her family history includes Arrhythmia in her father; Cancer (age of onset: 54) in her father; Diabetes in her father; Heart Disease in her father and maternal grandfather; No Known Problems in her mother, sister, and sister; Pacemaker in her father; Prostate Cancer in her father. Ms. Юлия Gould reports that she has never smoked. She has never used smokeless tobacco. She reports previous alcohol use. She reports that she does not use drugs. Her current medication list consists of Norgestim-Eth Estrad Triphasic, cetirizine, ibuprofen, lidocaine, and therapeutic multivitamin-minerals. Allergies: Pseudoephedrine, Septra [sulfamethoxazole-trimethoprim], Trimethoprim, and Pcn [penicillins]    Pertinent items from the review of systems are discussed in the HPI; the remainder of the ROS was reviewed and is negative.      Objective:     Vitals:    10/15/21 1003   BP: 119/80   Pulse: 93   Resp: 18   Temp: 97.4 °F (36.3 °C)   TempSrc: Oral   Weight: 159 lb 6.4 oz (72.3 kg)   Height: 5' 4\" (1.626 m)       Constitutional:  well-developed, well-nourished, NAD  Abd: soft, NT, ND, good BS - no residual cellulitis, no areas of fluctuance now  Kendra-ulcer skin: mildly indurated, pink, warm and dry, a minor papular-follicular rash that I think is from dressing adhesive. Ulcer(s): midline dehiscence clean, red, granular, rather superficial now; right sided dehiscence also more red and robustly granular, still a bit of depth, but not too much; no definite necrotic tissue now, no pus, no macroscopic biofilm, just serosanguinous drainage, minor tenderness to palpation and probing, no suture material exposed. Photos also saved in electronic chart.     Today's Wound Measurements, per RN documentation:  Wound 21 #2, right lower abdomen, non healing surgical incision, full thickness, onset 21-Wound Length (cm): 0.3 cm  Wound 21 #1, Mid abdomen, non healing surgical incision, full thickness, onset 21-Wound Length (cm): 0.1 cm    Wound 21 #2, right lower abdomen, non healing surgical incision, full thickness, onset 21-Wound Width (cm): 0.4 cm  Wound 21 #1, Mid abdomen, non healing surgical incision, full thickness, onset 21-Wound Width (cm): 0.5 cm    Wound 21 #2, right lower abdomen, non healing surgical incision, full thickness, onset 21-Wound Depth (cm): 0.4 cm  Wound 21 #1, Mid abdomen, non healing surgical incision, full thickness, onset 21-Wound Depth (cm): 1 cm  ______________________________    Lab Results   Component Value Date    LABALBU 3.4 2021     Lab Results   Component Value Date    CREATININE 0.7 2021     Lab Results   Component Value Date    HGB 10.8 (L) 2021     Assessment:     Patient Active Problem List   Diagnosis Code    Normal labor O80, Z37.9    H/O  section Z98.891    Fetal intolerance to labor, delivered, current hospitalization O77.9    Wound dehiscence, surgical, initial encounter T81.31XA    Retained suture T81.89XA, Z18.9       Assessment of today's active condition(s): Hx Caesarean section, post-op MRSA infection and two segments of wound dehiscence, one with a retained suture that I removed a couple of weeks ago, both with improving granulation and depth now, no signs of infection now, and I think she has enough momentum that she might heal the rest of the way without ongoing packing (which can sometimes inadvertently keep small areas like this open, if accidentally over-packed). Factors contributing to occurrence and/or persistence of the chronic ulcer include none. Medical necessity of today's visit is shown by the above documentation. Sharp debridement is not indicated today, based upon the exam findings in the wound(s) above. Discharge plan:     Treatment in the wound care center today, per RN documentation: Wound 09/22/21 #2, right lower abdomen, non healing surgical incision, full thickness, onset 8/27/21-Dressing/Treatment: Other (comment) (puracol, large bandaid )  Wound 09/22/21 #1, Mid abdomen, non healing surgical incision, full thickness, onset 8/27/21-Dressing/Treatment: Other (comment) (puracol, large bandaid ). Ok to get a full shower now, just wash the wounded area last, dress carefully after that. Keep up with protein intake. Home treatment: good handwashing before and after any dressing changes. Cleanse ulcer with saline or soap & water before dressing change. May use Vaseline (petrolatum), Aquaphor, Aveeno, CeraVe, Cetaphil, Eucerin, Lubriderm, etc for dry skin. Dressing type for home: Teresa and Dry cover dressing, once daily. Written discharge instructions given to patient. Follow up in 1 week.     Electronically signed by Da Womack MD on 10/19/2021 at 10:52 AM.

## 2021-10-20 ENCOUNTER — HOSPITAL ENCOUNTER (OUTPATIENT)
Dept: WOUND CARE | Age: 26
Discharge: HOME | End: 2021-10-20
Payer: COMMERCIAL

## 2021-10-20 VITALS
HEART RATE: 78 BPM | WEIGHT: 159 LBS | SYSTOLIC BLOOD PRESSURE: 137 MMHG | HEIGHT: 64 IN | DIASTOLIC BLOOD PRESSURE: 85 MMHG | RESPIRATION RATE: 18 BRPM | BODY MASS INDEX: 27.14 KG/M2 | TEMPERATURE: 97.5 F

## 2021-10-20 DIAGNOSIS — L23.1 ALLERGIC CONTACT DERMATITIS DUE TO ADHESIVES: ICD-10-CM

## 2021-10-20 DIAGNOSIS — T81.31XA WOUND DEHISCENCE, SURGICAL, INITIAL ENCOUNTER: Primary | ICD-10-CM

## 2021-10-20 PROCEDURE — 99212 OFFICE O/P EST SF 10 MIN: CPT

## 2021-10-20 PROCEDURE — 99212 OFFICE O/P EST SF 10 MIN: CPT | Performed by: INTERNAL MEDICINE

## 2021-10-20 RX ORDER — LIDOCAINE 50 MG/G
OINTMENT TOPICAL ONCE
Status: CANCELLED | OUTPATIENT
Start: 2021-10-20 | End: 2021-10-20

## 2021-10-20 RX ORDER — BACITRACIN ZINC AND POLYMYXIN B SULFATE 500; 1000 [USP'U]/G; [USP'U]/G
OINTMENT TOPICAL ONCE
Status: DISCONTINUED | OUTPATIENT
Start: 2021-10-20 | End: 2021-10-21 | Stop reason: HOSPADM

## 2021-10-20 RX ORDER — LIDOCAINE HYDROCHLORIDE 40 MG/ML
SOLUTION TOPICAL ONCE
Status: DISCONTINUED | OUTPATIENT
Start: 2021-10-20 | End: 2021-10-21 | Stop reason: HOSPADM

## 2021-10-20 RX ORDER — BACITRACIN ZINC AND POLYMYXIN B SULFATE 500; 1000 [USP'U]/G; [USP'U]/G
OINTMENT TOPICAL ONCE
Status: CANCELLED | OUTPATIENT
Start: 2021-10-20 | End: 2021-10-20

## 2021-10-20 RX ORDER — LIDOCAINE 40 MG/G
CREAM TOPICAL ONCE
Status: CANCELLED | OUTPATIENT
Start: 2021-10-20 | End: 2021-10-20

## 2021-10-20 RX ORDER — LIDOCAINE 50 MG/G
OINTMENT TOPICAL ONCE
Status: DISCONTINUED | OUTPATIENT
Start: 2021-10-20 | End: 2021-10-21 | Stop reason: HOSPADM

## 2021-10-20 RX ORDER — LIDOCAINE HYDROCHLORIDE 40 MG/ML
SOLUTION TOPICAL ONCE
Status: CANCELLED | OUTPATIENT
Start: 2021-10-20 | End: 2021-10-20

## 2021-10-20 RX ORDER — LIDOCAINE 40 MG/G
CREAM TOPICAL ONCE
Status: DISCONTINUED | OUTPATIENT
Start: 2021-10-20 | End: 2021-10-21 | Stop reason: HOSPADM

## 2021-10-20 ASSESSMENT — PAIN SCALES - GENERAL: PAINLEVEL_OUTOF10: 0

## 2021-10-20 NOTE — PLAN OF CARE
One wound healed. Remaining wound improved, no debridement. Pt. To apply antibiotic ointment to right sided wound, mid wound apply collagen & antibiotic ointment, change dressings once daily. F/u in [facility] in 1 week as ordered, pt. Aware to call sooner with any changes or questions/concerns. Pt. Aware if wound healed next week & no concerns, she may cancel her appt. Discharge instructions reviewed with patient, all questions answered, copy given to patient. Dressings were applied to all wounds per M.D. Instructions at this visit.

## 2021-10-27 ENCOUNTER — HOSPITAL ENCOUNTER (OUTPATIENT)
Dept: WOUND CARE | Age: 26
Discharge: HOME | End: 2021-10-27
Payer: COMMERCIAL

## 2021-10-27 VITALS
WEIGHT: 158.8 LBS | SYSTOLIC BLOOD PRESSURE: 125 MMHG | BODY MASS INDEX: 27.11 KG/M2 | DIASTOLIC BLOOD PRESSURE: 82 MMHG | RESPIRATION RATE: 16 BRPM | TEMPERATURE: 97.3 F | HEIGHT: 64 IN | HEART RATE: 86 BPM

## 2021-10-27 DIAGNOSIS — T81.31XA WOUND DEHISCENCE, SURGICAL, INITIAL ENCOUNTER: Primary | ICD-10-CM

## 2021-10-27 PROBLEM — T81.89XA RETAINED SUTURE: Status: RESOLVED | Noted: 2021-09-26 | Resolved: 2021-10-27

## 2021-10-27 PROBLEM — Z18.9 RETAINED SUTURE: Status: RESOLVED | Noted: 2021-09-26 | Resolved: 2021-10-27

## 2021-10-27 PROBLEM — L23.1 ALLERGIC CONTACT DERMATITIS DUE TO ADHESIVES: Status: ACTIVE | Noted: 2021-10-27

## 2021-10-27 PROCEDURE — 99212 OFFICE O/P EST SF 10 MIN: CPT

## 2021-10-27 PROCEDURE — 99212 OFFICE O/P EST SF 10 MIN: CPT | Performed by: INTERNAL MEDICINE

## 2021-10-27 RX ORDER — LIDOCAINE HYDROCHLORIDE 40 MG/ML
SOLUTION TOPICAL ONCE
Status: DISCONTINUED | OUTPATIENT
Start: 2021-10-27 | End: 2021-10-27

## 2021-10-27 RX ORDER — BACITRACIN ZINC AND POLYMYXIN B SULFATE 500; 1000 [USP'U]/G; [USP'U]/G
OINTMENT TOPICAL ONCE
Status: DISCONTINUED | OUTPATIENT
Start: 2021-10-27 | End: 2021-10-27

## 2021-10-27 RX ORDER — LIDOCAINE 50 MG/G
OINTMENT TOPICAL ONCE
Status: CANCELLED | OUTPATIENT
Start: 2021-10-27 | End: 2021-10-27

## 2021-10-27 RX ORDER — LIDOCAINE 50 MG/G
OINTMENT TOPICAL ONCE
Status: DISCONTINUED | OUTPATIENT
Start: 2021-10-27 | End: 2021-10-27

## 2021-10-27 RX ORDER — LIDOCAINE 40 MG/G
CREAM TOPICAL ONCE
Status: DISCONTINUED | OUTPATIENT
Start: 2021-10-27 | End: 2021-10-27

## 2021-10-27 RX ORDER — BACITRACIN ZINC AND POLYMYXIN B SULFATE 500; 1000 [USP'U]/G; [USP'U]/G
OINTMENT TOPICAL ONCE
Status: CANCELLED | OUTPATIENT
Start: 2021-10-27 | End: 2021-10-27

## 2021-10-27 RX ORDER — LIDOCAINE 40 MG/G
CREAM TOPICAL ONCE
Status: CANCELLED | OUTPATIENT
Start: 2021-10-27 | End: 2021-10-27

## 2021-10-27 RX ORDER — LIDOCAINE HYDROCHLORIDE 40 MG/ML
SOLUTION TOPICAL ONCE
Status: CANCELLED | OUTPATIENT
Start: 2021-10-27 | End: 2021-10-27

## 2021-10-27 ASSESSMENT — PAIN SCALES - GENERAL: PAINLEVEL_OUTOF10: 0

## 2021-10-27 NOTE — PLAN OF CARE
Wound healed. No dressing needed at this time. May use moisturizer to newly healed area as needed. May use Hydrocortisone to rash for the next few days as needed. No further f/u in [facility] needed at this time. Pt. Aware to call if wound reopens or with any questions/concerns. Discharge instructions reviewed with patient, all questions answered, copy given to patient.

## 2021-10-28 NOTE — PROGRESS NOTES
[facility] Progress Note    Randy Seip     : 1995    DATE OF VISIT:  10/20/2021    Subjective:     Randy Seip is a 27 y.o. female who has a dehisced surgical ulcer located on the abdomen (low midline). Current complaint of pain in this ulcer? yes. Quality of pain: aching and sharp  Timing: intermittent and decreasing in frequency  Severity: mild  Associated Signs/Symptoms: redness and drainage (light, serous to serosanguinous)  Other significant symptoms or pertinent ulcer history: feeling well overall, a bit of pruritus from recent dressings, less drainage, no fever, less wound depth. Additional ulcer(s) noted? no. One focus delicately healed, the other getting close. Ms. Lorri Quinn has a past medical history of MRSA (methicillin resistant staph aureus) culture positive and Wound infection after surgery. She has a past surgical history that includes LASIK (Bilateral, ); [place] tooth extraction (); and  section (N/A, 2021). Her family history includes Arrhythmia in her father; Cancer (age of onset: 54) in her father; Diabetes in her father; Heart Disease in her father and maternal grandfather; No Known Problems in her mother, sister, and sister; Pacemaker in her father; Prostate Cancer in her father. Ms. Lorri Quinn reports that she has never smoked. She has never used smokeless tobacco. She reports previous alcohol use. She reports that she does not use drugs. Her current medication list consists of Norgestim-Eth Estrad Triphasic, cetirizine, ibuprofen, and therapeutic multivitamin-minerals. Allergies: Pseudoephedrine, Septra [sulfamethoxazole-trimethoprim], Trimethoprim, and Pcn [penicillins]    Pertinent items from the review of systems are discussed in the HPI; the remainder of the ROS was reviewed and is negative.      Objective:     Vitals:    10/20/21 1514   BP: 137/85   Pulse: 78   Resp: 18   Temp: 97.5 °F (36.4 °C) TempSrc: Oral   Weight: 159 lb (72.1 kg)   Height: 5' 4\" (1.626 m)       Constitutional:  well-developed, well-nourished, NAD  Abd: soft, NT, ND, good BS - no residual cellulitis, no areas of fluctuance now  Kendra-ulcer skin: mildly indurated, pink, warm and dry, a minor papular-follicular rash that I think is from dressing adhesive. Ulcer(s): midline dehiscence clean, red, granular, rather superficial now; right sided dehiscence down to just a dry pinpoint, I think basically healed. Photos also saved in electronic chart.     Today's Wound Measurements, per RN documentation:  [REMOVED] Wound 21 #1, Mid abdomen, non healing surgical incision, full thickness, onset 21-Wound Length (cm): 0.1 cm  [REMOVED] Wound 21 #2, right lower abdomen, non healing surgical incision, full thickness, onset 21-Wound Length (cm): 0 cm    [REMOVED] Wound 21 #1, Mid abdomen, non healing surgical incision, full thickness, onset 21-Wound Width (cm): 0.1 cm  [REMOVED] Wound 21 #2, right lower abdomen, non healing surgical incision, full thickness, onset 21-Wound Width (cm): 0 cm    [REMOVED] Wound 21 #1, Mid abdomen, non healing surgical incision, full thickness, onset 21-Wound Depth (cm): 0.2 cm  [REMOVED] Wound 21 #2, right lower abdomen, non healing surgical incision, full thickness, onset 21-Wound Depth (cm): 0 cm  ______________________________    Lab Results   Component Value Date    LABALBU 3.4 2021     Lab Results   Component Value Date    CREATININE 0.7 2021     Lab Results   Component Value Date    HGB 10.8 (L) 2021     Assessment:     Patient Active Problem List   Diagnosis Code    H/O  section Z98.891    Wound dehiscence, surgical, initial encounter T81.31XA    Allergic contact dermatitis due to adhesives L23.1       Assessment of today's active condition(s): recent , post-op MRSA wound infection and two segments of dehiscence -- infection resolved with ABx, one segment of wound had a retained / exposed suture that needed removal, and is now healed, and the other segment is doing well also, no residual worrisome depth, no signs of infection. Factors contributing to occurrence and/or persistence of the chronic ulcer include none. Medical necessity of today's visit is shown by the above documentation. Sharp debridement is not indicated today, based upon the exam findings in the wound(s) above. Discharge plan:     Treatment in the wound care center today, per RN documentation: Smooth [place] Wound 09/22/21 #1, Mid abdomen, non healing surgical incision, full thickness, onset 8/27/21-Dressing/Treatment: Other (comment) (collagen,PSO,dry dressing)  [REMOVED] Wound 09/22/21 #2, right lower abdomen, non healing surgical incision, full thickness, onset 8/27/21-Dressing/Treatment: Other (comment) (PSO,dry dressing ). Home treatment: good handwashing before and after any dressing changes. Cleanse ulcer with saline or soap & water before dressing change. May use Vaseline (petrolatum), Aquaphor, Aveeno, CeraVe, Cetaphil, Eucerin, Lubriderm, etc for dry skin. Dressing type for home: as above, once daily. Written discharge instructions given to patient. Follow up in 1 week, if she's concerned whether things are completely healed, but if she is comfortable that everything is healed, she can call next week to cancel.     Electronically signed by Barbie Alaniz MD on 10/27/2021 at 11:12 PM.

## 2021-10-31 PROBLEM — T81.31XA WOUND DEHISCENCE, SURGICAL, INITIAL ENCOUNTER: Status: RESOLVED | Noted: 2021-09-22 | Resolved: 2021-10-31

## 2021-10-31 PROBLEM — L23.1 ALLERGIC CONTACT DERMATITIS DUE TO ADHESIVES: Status: RESOLVED | Noted: 2021-10-27 | Resolved: 2021-10-31

## 2021-11-01 NOTE — PROGRESS NOTES
[facility] Progress Note    Monserrat Akers     : 1995    DATE OF VISIT:  10/27/2021    Subjective:     Monserrat Akers is a 27 y.o. female who has a dehisced surgical ulcer located on the abdomen (low anterior). Current complaint of pain in this ulcer? yes. Quality of pain: dull  Timing: intermittent  Severity: mild  Associated Signs/Symptoms: redness  Other significant symptoms or pertinent ulcer history: feeling well overall, and she thinks she might be healed, but wanted us to have another look just to be sure. Mild rash present, no fever, mild pruritus, no definite drainage the last couple of days. Additional ulcer(s) noted? no.      Ms. Kyle Madsen has a past medical history of MRSA (methicillin resistant staph aureus) culture positive and Wound infection after surgery. She has a past surgical history that includes LASIK (Bilateral, ); [place] tooth extraction (); and  section (N/A, 2021). Her family history includes Arrhythmia in her father; Cancer (age of onset: 54) in her father; Diabetes in her father; Heart Disease in her father and maternal grandfather; No Known Problems in her mother, sister, and sister; Pacemaker in her father; Prostate Cancer in her father. Ms. Kyle Madsen reports that she has never smoked. She has never used smokeless tobacco. She reports previous alcohol use. She reports that she does not use drugs. Her current medication list consists of Norgestim-Eth Estrad Triphasic, cetirizine, ibuprofen, and therapeutic multivitamin-minerals. Allergies: Pseudoephedrine, Septra [sulfamethoxazole-trimethoprim], Trimethoprim, and Pcn [penicillins]    Pertinent items from the review of systems are discussed in the HPI; the remainder of the ROS was reviewed and is negative.      Objective:     Vitals:    10/27/21 0818   BP: 125/82   Pulse: 86   Resp: 16   Temp: 97.3 °F (36.3 °C)   TempSrc: Oral   Weight: 158 lb 12.8 oz (72 kg) Height: 5' 4\" (1.626 m)       Constitutional:  well-developed, well-nourished, NAD  Abd: soft, NT, ND, good BS - no residual cellulitis, no areas of fluctuance now  Kendra-ulcer skin: mildly indurated, pink, warm and dry, a minor papular-follicular rash that I think is from dressing adhesive. Ulcer(s): both spots healed this week, one a bit delicate, the other already looking more durable. Photos also saved in electronic chart. Today's Wound Measurements, per RN documentation:  [REMOVED] Wound 21 #1, Mid abdomen, non healing surgical incision, full thickness, onset 21-Wound Length (cm): 0 cm    [REMOVED] Wound 21 #1, Mid abdomen, non healing surgical incision, full thickness, onset 21-Wound Width (cm): 0 cm    [REMOVED] Wound 21 #1, Mid abdomen, non healing surgical incision, full thickness, onset 21-Wound Depth (cm): 0 cm  ______________________________    Lab Results   Component Value Date    LABALBU 3.4 2021     Lab Results   Component Value Date    CREATININE 0.7 2021     Lab Results   Component Value Date    HGB 10.8 (L) 2021     Assessment:     Patient Active Problem List   Diagnosis Code    H/O  section Z98.891    Wound dehiscence, surgical, initial encounter T81.31XA    Allergic contact dermatitis due to adhesives L23.1       Assessment of today's active condition(s): recent , post-op MRSA soft tissue infection, two segments of surgical wound dehiscence, one with a retained (presumed bacterially colonized) suture, excised, wound debrided a bit, healed now, and I think it should remain so. Mild contact dermatitis from recent dressings, should fade with time, and is mildly symptomatic. Factors contributing to occurrence and/or persistence of the chronic ulcer include none. Medical necessity of today's visit is shown by the above documentation. Sharp debridement is not indicated today, based upon the exam findings in the wound(s) above. Discharge plan:     Treatment in the wound care center today, per RN documentation: none needed. No specific dressings needed now. Can use a bit of hydrocortisone briefly to the rash, if needed. Otherwise just showers, gentle cleansing, moisturizer PRN, etc.   FU with OB-GYN on a routine basis. Written discharge instructions given to patient. Follow up here PRN.     Electronically signed by Gregg Ware MD on 10/31/2021 at 9:51 PM.

## (undated) DEVICE — CHLORAPREP 26ML ORANGE

## (undated) DEVICE — DRESSING COMP IS W4XL10IN PD W2XL8IN CNTCT LAYR ADH

## (undated) DEVICE — TRAY URIN CATH 16FR DRNGE BG STATLOK STBL DEV F SURSTP

## (undated) DEVICE — SOLUTION IV IRRIG POUR BRL 0.9% SODIUM CHL 2F7124

## (undated) DEVICE — SUTURE VCRL SZ 1 L36IN ABSRB VLT L36MM CT-1 1/2 CIR J347H

## (undated) DEVICE — SUTURE ABSORBABLE BRAIDED 2-0 CT-1 27 IN UD VICRYL J259H

## (undated) DEVICE — Device

## (undated) DEVICE — GARMENT COMPR L FOR 23IN CALF FLOTRN

## (undated) DEVICE — GLOVE SURG SZ 65 THK91MIL LTX FREE SYN POLYISOPRENE

## (undated) DEVICE — S/USE RESUS KIT W/O MASK (10): Brand: FISHER & PAYKEL HEALTHCARE

## (undated) DEVICE — SUTURE MCRYL SZ 3-0 L27IN ABSRB UD L60MM KS STR REV CUT Y523H

## (undated) DEVICE — BLADE CLIPPER GEN PURP NS

## (undated) DEVICE — SUTURE VCRL SZ 0 L36IN ABSRB UD L36MM CT-1 1/2 CIR J946H

## (undated) DEVICE — PAD,NON-ADHERENT,3X8,STERILE,LF,1/PK: Brand: MEDLINE

## (undated) DEVICE — 3M™ STERI-STRIP™ COMPOUND BENZOIN TINCTURE 40 BAGS/CARTON 4 CARTONS/CASE C1544: Brand: 3M™ STERI-STRIP™